# Patient Record
Sex: MALE | Race: WHITE | NOT HISPANIC OR LATINO | URBAN - METROPOLITAN AREA
[De-identification: names, ages, dates, MRNs, and addresses within clinical notes are randomized per-mention and may not be internally consistent; named-entity substitution may affect disease eponyms.]

---

## 2017-01-14 ENCOUNTER — EMERGENCY (EMERGENCY)
Facility: HOSPITAL | Age: 22
LOS: 0 days | Discharge: HOME | End: 2017-01-14
Admitting: EMERGENCY MEDICINE
Payer: COMMERCIAL
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
## 2017-01-14 DIAGNOSIS — S71.112A LACERATION WITHOUT FOREIGN BODY, LEFT THIGH, INITIAL ENCOUNTER: ICD-10-CM

## 2017-01-14 DIAGNOSIS — S81.819A LACERATION WITHOUT FOREIGN BODY, UNSPECIFIED LOWER LEG, INITIAL ENCOUNTER: ICD-10-CM
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
## 2017-01-14 PROCEDURE — 12001 RPR S/N/AX/GEN/TRNK 2.5CM/<: CPT

## 2017-01-14 PROCEDURE — 73562 X-RAY EXAM OF KNEE 3: CPT | Mod: 26,LT

## 2017-01-14 PROCEDURE — 99283 EMERGENCY DEPT VISIT LOW MDM: CPT | Mod: 25

## 2017-01-30 ENCOUNTER — EMERGENCY (EMERGENCY)
Facility: HOSPITAL | Age: 22
LOS: 0 days | Discharge: HOME | End: 2017-01-30
Attending: EMERGENCY MEDICINE | Admitting: EMERGENCY MEDICINE
Payer: COMMERCIAL

## 2017-01-30 VITALS
DIASTOLIC BLOOD PRESSURE: 80 MMHG | HEIGHT: 69 IN | HEART RATE: 103 BPM | RESPIRATION RATE: 18 BRPM | WEIGHT: 151.02 LBS | SYSTOLIC BLOOD PRESSURE: 154 MMHG | TEMPERATURE: 98 F
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
## 2017-01-30 DIAGNOSIS — S81.812A LACERATION WITHOUT FOREIGN BODY, LEFT LOWER LEG, INITIAL ENCOUNTER: ICD-10-CM
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
## 2017-01-30 PROCEDURE — 99282 EMERGENCY DEPT VISIT SF MDM: CPT

## 2017-01-30 NOTE — ED PROVIDER NOTE - CARE PLAN
Principal Discharge DX:	Suture check  Instructions for follow-up, activity and diet:	Keep wound dry and clean.  Return for any signs of infection as discussed.  Return for any new/worsening symptoms or concerns.

## 2017-01-30 NOTE — ED ADULT NURSE NOTE - CHPI ED SYMPTOMS NEG
no redness/no bleeding at site/no chills/no red streaks/no bleeding/no purulent drainage/no fever/no pain/no drainage

## 2017-01-30 NOTE — ED PROVIDER NOTE - PLAN OF CARE
Keep wound dry and clean.  Return for any signs of infection as discussed.  Return for any new/worsening symptoms or concerns.

## 2017-01-30 NOTE — ED PROVIDER NOTE - MEDICAL DECISION MAKING DETAILS
21M presents for wound check and suture removal s/p puncture wound from glass approx 10 days ago.  VSS, wound healed with no signs of infection or wound dehiscence.  Remove sutures, TBD.

## 2017-01-30 NOTE — ED PROVIDER NOTE - OBJECTIVE STATEMENT
21M healthy presents for suture removal.  Pt states he accidentally sat on glass in bed with wound to LLE s/p approximation with 2 suture approx 10 days ago.  Tetanus updated at that time.  Denies fever/chills, severe pain, erythema or purulent wound discharge.

## 2017-01-30 NOTE — ED PROVIDER NOTE - SKIN, MLM
Skin normal color for race, warm, dry,  approx 1.5cm wound to lateral LLE, healed with 2 nonabsorbable interrupted sutures with no wound dehiscence, no surround erythema or discharge.

## 2018-06-22 ENCOUNTER — EMERGENCY (EMERGENCY)
Facility: HOSPITAL | Age: 23
LOS: 0 days | Discharge: HOME | End: 2018-06-23
Attending: EMERGENCY MEDICINE | Admitting: STUDENT IN AN ORGANIZED HEALTH CARE EDUCATION/TRAINING PROGRAM
Payer: COMMERCIAL

## 2018-06-22 VITALS
HEART RATE: 99 BPM | WEIGHT: 160.06 LBS | HEIGHT: 70 IN | RESPIRATION RATE: 18 BRPM | TEMPERATURE: 98 F | OXYGEN SATURATION: 99 % | DIASTOLIC BLOOD PRESSURE: 84 MMHG | SYSTOLIC BLOOD PRESSURE: 108 MMHG

## 2018-06-22 LAB
ALBUMIN SERPL ELPH-MCNC: 3.4 G/DL — SIGNIFICANT CHANGE UP (ref 3.3–5)
ALP SERPL-CCNC: 76 U/L — SIGNIFICANT CHANGE UP (ref 40–120)
ALT FLD-CCNC: 24 U/L — SIGNIFICANT CHANGE UP (ref 12–78)
ANION GAP SERPL CALC-SCNC: 7 MMOL/L — SIGNIFICANT CHANGE UP (ref 5–17)
APAP SERPL-MCNC: <2 UG/ML — LOW (ref 10–30)
APPEARANCE UR: CLEAR — SIGNIFICANT CHANGE UP
AST SERPL-CCNC: 17 U/L — SIGNIFICANT CHANGE UP (ref 15–37)
BASOPHILS # BLD AUTO: 0.02 K/UL — SIGNIFICANT CHANGE UP (ref 0–0.2)
BASOPHILS NFR BLD AUTO: 0.3 % — SIGNIFICANT CHANGE UP (ref 0–2)
BILIRUB DIRECT SERPL-MCNC: 0.1 MG/DL — SIGNIFICANT CHANGE UP (ref 0–0.2)
BILIRUB INDIRECT FLD-MCNC: 0.2 MG/DL — SIGNIFICANT CHANGE UP (ref 0.2–1)
BILIRUB SERPL-MCNC: 0.3 MG/DL — SIGNIFICANT CHANGE UP (ref 0.2–1.2)
BILIRUB UR-MCNC: NEGATIVE — SIGNIFICANT CHANGE UP
BUN SERPL-MCNC: 15 MG/DL — SIGNIFICANT CHANGE UP (ref 7–23)
CALCIUM SERPL-MCNC: 8.2 MG/DL — LOW (ref 8.5–10.1)
CHLORIDE SERPL-SCNC: 110 MMOL/L — HIGH (ref 96–108)
CO2 SERPL-SCNC: 26 MMOL/L — SIGNIFICANT CHANGE UP (ref 22–31)
COLOR SPEC: YELLOW — SIGNIFICANT CHANGE UP
CREAT SERPL-MCNC: 0.69 MG/DL — SIGNIFICANT CHANGE UP (ref 0.5–1.3)
DIFF PNL FLD: NEGATIVE — SIGNIFICANT CHANGE UP
EOSINOPHIL # BLD AUTO: 0.2 K/UL — SIGNIFICANT CHANGE UP (ref 0–0.5)
EOSINOPHIL NFR BLD AUTO: 3.1 % — SIGNIFICANT CHANGE UP (ref 0–6)
ETHANOL SERPL-MCNC: <10 MG/DL — SIGNIFICANT CHANGE UP (ref 0–10)
GLUCOSE SERPL-MCNC: 106 MG/DL — HIGH (ref 70–99)
GLUCOSE UR QL: NEGATIVE MG/DL — SIGNIFICANT CHANGE UP
HCT VFR BLD CALC: 37.4 % — LOW (ref 39–50)
HGB BLD-MCNC: 12.6 G/DL — LOW (ref 13–17)
HIV 1 & 2 AB SERPL IA.RAPID: SIGNIFICANT CHANGE UP
IMM GRANULOCYTES NFR BLD AUTO: 0.3 % — SIGNIFICANT CHANGE UP (ref 0–1.5)
KETONES UR-MCNC: NEGATIVE — SIGNIFICANT CHANGE UP
LEUKOCYTE ESTERASE UR-ACNC: ABNORMAL
LYMPHOCYTES # BLD AUTO: 2.53 K/UL — SIGNIFICANT CHANGE UP (ref 1–3.3)
LYMPHOCYTES # BLD AUTO: 39.5 % — SIGNIFICANT CHANGE UP (ref 13–44)
MCHC RBC-ENTMCNC: 29 PG — SIGNIFICANT CHANGE UP (ref 27–34)
MCHC RBC-ENTMCNC: 33.7 GM/DL — SIGNIFICANT CHANGE UP (ref 32–36)
MCV RBC AUTO: 86.2 FL — SIGNIFICANT CHANGE UP (ref 80–100)
MONOCYTES # BLD AUTO: 0.55 K/UL — SIGNIFICANT CHANGE UP (ref 0–0.9)
MONOCYTES NFR BLD AUTO: 8.6 % — SIGNIFICANT CHANGE UP (ref 2–14)
NEUTROPHILS # BLD AUTO: 3.08 K/UL — SIGNIFICANT CHANGE UP (ref 1.8–7.4)
NEUTROPHILS NFR BLD AUTO: 48.2 % — SIGNIFICANT CHANGE UP (ref 43–77)
NITRITE UR-MCNC: NEGATIVE — SIGNIFICANT CHANGE UP
NRBC # BLD: 0 /100 WBCS — SIGNIFICANT CHANGE UP (ref 0–0)
PH UR: 6 — SIGNIFICANT CHANGE UP (ref 5–8)
PLATELET # BLD AUTO: 187 K/UL — SIGNIFICANT CHANGE UP (ref 150–400)
POTASSIUM SERPL-MCNC: 3.4 MMOL/L — LOW (ref 3.5–5.3)
POTASSIUM SERPL-SCNC: 3.4 MMOL/L — LOW (ref 3.5–5.3)
PROT SERPL-MCNC: 5.8 GM/DL — LOW (ref 6–8.3)
PROT UR-MCNC: NEGATIVE MG/DL — SIGNIFICANT CHANGE UP
RBC # BLD: 4.34 M/UL — SIGNIFICANT CHANGE UP (ref 4.2–5.8)
RBC # FLD: 12 % — SIGNIFICANT CHANGE UP (ref 10.3–14.5)
SALICYLATES SERPL-MCNC: <1.7 MG/DL — LOW (ref 2.8–20)
SODIUM SERPL-SCNC: 143 MMOL/L — SIGNIFICANT CHANGE UP (ref 135–145)
SP GR SPEC: 1.02 — SIGNIFICANT CHANGE UP (ref 1.01–1.02)
UROBILINOGEN FLD QL: NEGATIVE MG/DL — SIGNIFICANT CHANGE UP
WBC # BLD: 6.4 K/UL — SIGNIFICANT CHANGE UP (ref 3.8–10.5)
WBC # FLD AUTO: 6.4 K/UL — SIGNIFICANT CHANGE UP (ref 3.8–10.5)
WBC UR QL: SIGNIFICANT CHANGE UP

## 2018-06-22 PROCEDURE — 99285 EMERGENCY DEPT VISIT HI MDM: CPT

## 2018-06-22 PROCEDURE — 93010 ELECTROCARDIOGRAM REPORT: CPT

## 2018-06-22 NOTE — ED ADULT NURSE NOTE - OBJECTIVE STATEMENT
23 y/o male awake alert and oriented x4 BIBPD s/p pt's ex-boyfriend's mother called the police c/o pt making delusional comments. pt HIV (+). Pt request to be tested for HIV this visit. pt calm, cooperative in ED. Denies SI/HI.
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
## 2018-06-22 NOTE — ED PROVIDER NOTE - OBJECTIVE STATEMENT
23 y/o m presenting to the ED BIBPD due to delusions and agitation. PD 2* called by pt's step mother because pt was making delusional statements and being disruptive. Denies fever, chills. 21 y/o m with PMHx of borderline personality disorder, Crystal Meth abuse presenting to the ED BIBPD due to delusions and agitation. PD 2* called by pt's step mother because pt was making delusional statements and being disruptive. Denies fever, chills. Pt requesting HIV testing in ED. 21 y/o m with PMHx of borderline personality disorder, Crystal Meth abuse presenting to the ED BIBPD due to delusions and agitation. PD 2* called by pt's step mother because pt was making delusional statements and being disruptive. Denies fever, chills. Pt requesting HIV testing in ED. Denies SI, HI. 23 y/o m with PMHx of borderline personality disorder, Crystal Meth abuse presenting to the ED BIBPD due to delusions and agitation. PD 2* called by pt's step mother because pt was making delusional statements and being disruptive. Pt denies any Acute c/o. Requesting HIV testing in ED. Denies SI, HI. Pt does not want to answer questions at this time, hx obtained from EMS.

## 2018-06-23 VITALS
TEMPERATURE: 98 F | HEART RATE: 58 BPM | OXYGEN SATURATION: 100 % | SYSTOLIC BLOOD PRESSURE: 108 MMHG | RESPIRATION RATE: 18 BRPM | DIASTOLIC BLOOD PRESSURE: 50 MMHG

## 2018-06-23 DIAGNOSIS — F15.929 OTHER STIMULANT USE, UNSPECIFIED WITH INTOXICATION, UNSPECIFIED: ICD-10-CM

## 2018-06-23 LAB
AMPHET UR-MCNC: POSITIVE — SIGNIFICANT CHANGE UP
BARBITURATES UR SCN-MCNC: NEGATIVE — SIGNIFICANT CHANGE UP
BENZODIAZ UR-MCNC: NEGATIVE — SIGNIFICANT CHANGE UP
COCAINE METAB.OTHER UR-MCNC: NEGATIVE — SIGNIFICANT CHANGE UP
METHADONE UR-MCNC: NEGATIVE — SIGNIFICANT CHANGE UP
OPIATES UR-MCNC: NEGATIVE — SIGNIFICANT CHANGE UP
PCP SPEC-MCNC: SIGNIFICANT CHANGE UP
PCP UR-MCNC: NEGATIVE — SIGNIFICANT CHANGE UP
THC UR QL: NEGATIVE — SIGNIFICANT CHANGE UP

## 2018-06-23 PROCEDURE — 90792 PSYCH DIAG EVAL W/MED SRVCS: CPT | Mod: GT

## 2018-06-23 RX ORDER — HALOPERIDOL DECANOATE 100 MG/ML
5 INJECTION INTRAMUSCULAR ONCE
Qty: 0 | Refills: 0 | Status: COMPLETED | OUTPATIENT
Start: 2018-06-23 | End: 2018-06-23

## 2018-06-23 RX ADMIN — Medication 4 MILLIGRAM(S): at 00:45

## 2018-06-23 RX ADMIN — HALOPERIDOL DECANOATE 5 MILLIGRAM(S): 100 INJECTION INTRAMUSCULAR at 00:46

## 2018-06-23 NOTE — ED BEHAVIORAL HEALTH ASSESSMENT NOTE - PSYCHIATRIC ISSUES AND PLAN (INCLUDE STANDING AND PRN MEDICATION)
Hold in ED for safety and further observation, to reassess upon possible sobriety in the AM with a live assessment
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
## 2018-06-23 NOTE — ED BEHAVIORAL HEALTH ASSESSMENT NOTE - DESCRIPTION
paranoia and refusal to cooperate in the ED unable to assess was living with his ex-boyfriend and ex-boyfriends mother

## 2018-06-23 NOTE — ED BEHAVIORAL HEALTH ASSESSMENT NOTE - SUMMARY
23 yo M with a reported history of borderline personality disorder and crystal methamphetamine use disorder presents to the ED with complaints of paranoia; in the context of likely crystal meth intoxication. Currently, he is uncooperative and turned off the telepsychiatry monitor after he came on screen. Per collateral and prior medical records, his mental status exam is significant for paranoid delusions about his ex-boyfriend working for the [employer], phone lines being tapped and homicidal threats toward them. Impressions include acute psychosis secondary to crystal methamphetamine intoxication. It's possible that this may clear with additional time as he metabolizes the illicit substance, or he may remain psychotic even with additional time. Since he will not cooperate with a remote assessment, he will need to be assessed in person to determine his final disposition. Will hold in the ED for safety and further observation for now.

## 2018-06-24 ENCOUNTER — EMERGENCY (EMERGENCY)
Facility: HOSPITAL | Age: 23
LOS: 1 days | Discharge: OTHER INSTITUTION | End: 2018-06-24
Attending: EMERGENCY MEDICINE
Payer: COMMERCIAL

## 2018-06-24 VITALS
DIASTOLIC BLOOD PRESSURE: 75 MMHG | HEIGHT: 66 IN | OXYGEN SATURATION: 100 % | HEART RATE: 86 BPM | RESPIRATION RATE: 18 BRPM | WEIGHT: 149.91 LBS | TEMPERATURE: 98 F | SYSTOLIC BLOOD PRESSURE: 122 MMHG

## 2018-06-24 DIAGNOSIS — F16.10 HALLUCINOGEN ABUSE, UNCOMPLICATED: ICD-10-CM

## 2018-06-24 DIAGNOSIS — F12.10 CANNABIS ABUSE, UNCOMPLICATED: ICD-10-CM

## 2018-06-24 DIAGNOSIS — F60.3 BORDERLINE PERSONALITY DISORDER: ICD-10-CM

## 2018-06-24 DIAGNOSIS — F19.94 OTHER PSYCHOACTIVE SUBSTANCE USE, UNSPECIFIED WITH PSYCHOACTIVE SUBSTANCE-INDUCED MOOD DISORDER: ICD-10-CM

## 2018-06-24 LAB
ALBUMIN SERPL ELPH-MCNC: 4.2 G/DL — SIGNIFICANT CHANGE UP (ref 3.3–5.2)
ALP SERPL-CCNC: 69 U/L — SIGNIFICANT CHANGE UP (ref 40–120)
ALT FLD-CCNC: 19 U/L — SIGNIFICANT CHANGE UP
AMPHET UR-MCNC: NEGATIVE — SIGNIFICANT CHANGE UP
ANION GAP SERPL CALC-SCNC: 12 MMOL/L — SIGNIFICANT CHANGE UP (ref 5–17)
APAP SERPL-MCNC: <7.5 UG/ML — LOW (ref 10–26)
APPEARANCE UR: CLEAR — SIGNIFICANT CHANGE UP
AST SERPL-CCNC: 21 U/L — SIGNIFICANT CHANGE UP
BARBITURATES UR SCN-MCNC: NEGATIVE — SIGNIFICANT CHANGE UP
BASOPHILS # BLD AUTO: 0 K/UL — SIGNIFICANT CHANGE UP (ref 0–0.2)
BASOPHILS NFR BLD AUTO: 0.1 % — SIGNIFICANT CHANGE UP (ref 0–2)
BENZODIAZ UR-MCNC: NEGATIVE — SIGNIFICANT CHANGE UP
BILIRUB SERPL-MCNC: 0.2 MG/DL — LOW (ref 0.4–2)
BILIRUB UR-MCNC: NEGATIVE — SIGNIFICANT CHANGE UP
BUN SERPL-MCNC: 18 MG/DL — SIGNIFICANT CHANGE UP (ref 8–20)
CALCIUM SERPL-MCNC: 9.4 MG/DL — SIGNIFICANT CHANGE UP (ref 8.6–10.2)
CHLORIDE SERPL-SCNC: 103 MMOL/L — SIGNIFICANT CHANGE UP (ref 98–107)
CO2 SERPL-SCNC: 26 MMOL/L — SIGNIFICANT CHANGE UP (ref 22–29)
COCAINE METAB.OTHER UR-MCNC: NEGATIVE — SIGNIFICANT CHANGE UP
COLOR SPEC: YELLOW — SIGNIFICANT CHANGE UP
CREAT SERPL-MCNC: 0.69 MG/DL — SIGNIFICANT CHANGE UP (ref 0.5–1.3)
DIFF PNL FLD: NEGATIVE — SIGNIFICANT CHANGE UP
EOSINOPHIL # BLD AUTO: 0.2 K/UL — SIGNIFICANT CHANGE UP (ref 0–0.5)
EOSINOPHIL NFR BLD AUTO: 2 % — SIGNIFICANT CHANGE UP (ref 0–5)
ETHANOL SERPL-MCNC: <10 MG/DL — SIGNIFICANT CHANGE UP
GLUCOSE SERPL-MCNC: 97 MG/DL — SIGNIFICANT CHANGE UP (ref 70–115)
GLUCOSE UR QL: NEGATIVE MG/DL — SIGNIFICANT CHANGE UP
HCT VFR BLD CALC: 41.9 % — LOW (ref 42–52)
HGB BLD-MCNC: 14.1 G/DL — SIGNIFICANT CHANGE UP (ref 14–18)
KETONES UR-MCNC: NEGATIVE — SIGNIFICANT CHANGE UP
LEUKOCYTE ESTERASE UR-ACNC: ABNORMAL
LYMPHOCYTES # BLD AUTO: 1.9 K/UL — SIGNIFICANT CHANGE UP (ref 1–4.8)
LYMPHOCYTES # BLD AUTO: 25.2 % — SIGNIFICANT CHANGE UP (ref 20–55)
MCHC RBC-ENTMCNC: 29.2 PG — SIGNIFICANT CHANGE UP (ref 27–31)
MCHC RBC-ENTMCNC: 33.7 G/DL — SIGNIFICANT CHANGE UP (ref 32–36)
MCV RBC AUTO: 86.7 FL — SIGNIFICANT CHANGE UP (ref 80–94)
METHADONE UR-MCNC: NEGATIVE — SIGNIFICANT CHANGE UP
MONOCYTES # BLD AUTO: 0.7 K/UL — SIGNIFICANT CHANGE UP (ref 0–0.8)
MONOCYTES NFR BLD AUTO: 9.3 % — SIGNIFICANT CHANGE UP (ref 3–10)
NEUTROPHILS # BLD AUTO: 4.7 K/UL — SIGNIFICANT CHANGE UP (ref 1.8–8)
NEUTROPHILS NFR BLD AUTO: 63.1 % — SIGNIFICANT CHANGE UP (ref 37–73)
NITRITE UR-MCNC: NEGATIVE — SIGNIFICANT CHANGE UP
OPIATES UR-MCNC: NEGATIVE — SIGNIFICANT CHANGE UP
PCP SPEC-MCNC: SIGNIFICANT CHANGE UP
PCP UR-MCNC: NEGATIVE — SIGNIFICANT CHANGE UP
PH UR: 8 — SIGNIFICANT CHANGE UP (ref 5–8)
PLATELET # BLD AUTO: 195 K/UL — SIGNIFICANT CHANGE UP (ref 150–400)
POTASSIUM SERPL-MCNC: 4.2 MMOL/L — SIGNIFICANT CHANGE UP (ref 3.5–5.3)
POTASSIUM SERPL-SCNC: 4.2 MMOL/L — SIGNIFICANT CHANGE UP (ref 3.5–5.3)
PROT SERPL-MCNC: 6.4 G/DL — LOW (ref 6.6–8.7)
PROT UR-MCNC: NEGATIVE MG/DL — SIGNIFICANT CHANGE UP
RBC # BLD: 4.83 M/UL — SIGNIFICANT CHANGE UP (ref 4.6–6.2)
RBC # FLD: 12.2 % — SIGNIFICANT CHANGE UP (ref 11–15.6)
SALICYLATES SERPL-MCNC: <0.6 MG/DL — LOW (ref 10–20)
SODIUM SERPL-SCNC: 141 MMOL/L — SIGNIFICANT CHANGE UP (ref 135–145)
SP GR SPEC: 1.01 — SIGNIFICANT CHANGE UP (ref 1.01–1.02)
THC UR QL: NEGATIVE — SIGNIFICANT CHANGE UP
TSH SERPL-MCNC: 0.73 UIU/ML — SIGNIFICANT CHANGE UP (ref 0.27–4.2)
UROBILINOGEN FLD QL: NEGATIVE MG/DL — SIGNIFICANT CHANGE UP
WBC # BLD: 7.5 K/UL — SIGNIFICANT CHANGE UP (ref 4.8–10.8)
WBC # FLD AUTO: 7.5 K/UL — SIGNIFICANT CHANGE UP (ref 4.8–10.8)
WBC UR QL: SIGNIFICANT CHANGE UP

## 2018-06-24 PROCEDURE — 81001 URINALYSIS AUTO W/SCOPE: CPT

## 2018-06-24 PROCEDURE — 93005 ELECTROCARDIOGRAM TRACING: CPT

## 2018-06-24 PROCEDURE — 99285 EMERGENCY DEPT VISIT HI MDM: CPT | Mod: 25

## 2018-06-24 PROCEDURE — 80053 COMPREHEN METABOLIC PANEL: CPT

## 2018-06-24 PROCEDURE — 90792 PSYCH DIAG EVAL W/MED SRVCS: CPT

## 2018-06-24 PROCEDURE — 36415 COLL VENOUS BLD VENIPUNCTURE: CPT

## 2018-06-24 PROCEDURE — 84443 ASSAY THYROID STIM HORMONE: CPT

## 2018-06-24 PROCEDURE — 85027 COMPLETE CBC AUTOMATED: CPT

## 2018-06-24 PROCEDURE — 93010 ELECTROCARDIOGRAM REPORT: CPT

## 2018-06-24 PROCEDURE — 80307 DRUG TEST PRSMV CHEM ANLYZR: CPT

## 2018-06-24 NOTE — ED ADULT NURSE NOTE - CHPI ED SYMPTOMS NEG
no homicidal/no agitation/no confusion/no suicidal/no weakness/no change in level of consciousness/no hallucinations/no disorientation/no paranoia

## 2018-06-24 NOTE — ED ADULT TRIAGE NOTE - CHIEF COMPLAINT QUOTE
pt sent from [facility] for suicidal ideations, EMS reports they "have no beds." pt with no medical complaints, hx use of crystal meth and suicidal attempt about 1 week ago. pt is awake, alert, oriented, calm and cooperative on arrival, reports he went to [facility] voluntarily. pt seen by ER MD immediately on arrival, pt sent to ED psych.

## 2018-06-24 NOTE — ED ADULT NURSE NOTE - CHIEF COMPLAINT QUOTE
pt sent from [facility] for suicidal ideations, EMS reports they "have no beds." pt with no medical complaints, hx use of crystal meth and suicidal attempt about 1 week ago. pt is awake, alert, oriented, calm and cooperative on arrival, reports he went to [facility] voluntarily. pt seen by ER MD immediately on arrival, pt sent to ED psych.

## 2018-06-24 NOTE — ED ADULT NURSE NOTE - OBJECTIVE STATEMENT
Patient presented in  area dressed in sweat shirt and swim trunks.  Patient awake and alert.  Speech is clear.  Patient reports he went to [facility] today seeking inpatient hospitalization for depression and abusing crystal meth by injection.  Last use of crystal meth was a week ago and he has been abusing it for 3 years.  Patient reports he has been fighting with his partner about getting help.  Patient has been treated psychiatrically in the past with medication but has not been on any in over 3 years and is not in any current treatment.  Feels hopeless but denies suicidal ideation or intent to harm himself.  Denies any homicidal ideations.  Denies auditory or visual hallucinations and no overt delusions expressed.

## 2018-06-24 NOTE — ED PROVIDER NOTE - OBJECTIVE STATEMENT
23 y/o M pt BIBA with hx of borderline personality disorder and substance abuse presents to ED from [facility] for SI. Pt went to [facility] for treatment but they did not have any beds available so pt was sent to [facility] ED. Pt states he has been depressed for some time but has no thoughts of harming himself or others. He last used Crystal Meth 4-5 days ago. Denies hx of HTN or DM, HI, hallucinations, abdominal pain, cough, blurry vision, diplopia, HA, nausea, vomiting, CP and SOB. No further complaints at this time.

## 2018-06-24 NOTE — ED BEHAVIORAL HEALTH ASSESSMENT NOTE - HPI (INCLUDE ILLNESS QUALITY, SEVERITY, DURATION, TIMING, CONTEXT, MODIFYING FACTORS, ASSOCIATED SIGNS AND SYMPTOMS)
Patient a 23 y/o single male, unemployed, domiciled with partner, no prior SI/SA, hx of Cannabis/Ecstasy abuse, no prior Psychiatric hospitalization, no medical issues was BIB/EMS due to above reasons.    Patient earlier went to [facility] and were referred to come her for help. Patient endorsed that he has been using Ecstasy for more than a year, and was also using cannabis 2-3 times a week since age 15 and for a year has been using almost daily. Last time he used was past Wednesday. He feels depressed, depressed as he unemployed for 2-3 years, and has no motivation to do anything now. He denied being depressed, but at the same time he added that he is OK, feels anxious at times, denied A/H or paranoid beliefs, but was acting bizarre when asked " Whether FBI/JOHN are watching you, he answered why are you asking that questions ". He later added that hears voices with V/H when he is high on Ecstasy and also sees shadows, with good sleep/appetite. He added that he wants to get better, good with partner and have education in Criminal Justice. He later added that he was diagnosed with ADHD, was prescribed Straterra/Trileptal/Lithium which was given by a PCP and later stopped taking them for reasons unknown. He added that at times he gets energetic, and starts cleaning the house, be more pro-active  with multi-task involvement and does not feel tired or fatigued. He was guarded during interview, was answering questions slowly and taking time to think.    Writer called his partner @ [phone] who informed that he is acting bizarre, paranoid, delusional, he is violent, at times he is also physical with him, verbally cursing him, and also very paranoid that he feels that is partner is working with police and JOHN for reasons unknown. Patient a 23 y/o single male, unemployed, domiciled with partner, no prior SI/SA, hx of Cannabis/Ecstasy abuse, no prior Psychiatric hospitalization, no medical issues was BIB/EMS due to above reasons.    Patient earlier went to [facility] and were referred to come her for help. Patient endorsed that he has been using Ecstasy for more than a year, and was also using cannabis 2-3 times a week since age 15 and for a year has been using almost daily. Last time he used was past Wednesday. He feels depressed, depressed as he unemployed for 2-3 years, and has no motivation to do anything now. He denied being depressed, but at the same time he added that he is OK, feels anxious at times, denied A/H or paranoid beliefs, but was acting bizarre when asked " Whether FBI/JOHN are watching you, he answered why are you asking that questions ". He later added that hears voices with V/H when he is high on Ecstasy and also sees shadows, with good sleep/appetite. He added that he wants to get better, good with partner and have education in Criminal Justice. He later added that he was diagnosed with ADHD, was prescribed Straterra/Trileptal/Lithium which was given by a PCP and later stopped taking them for reasons unknown. He added that at times he gets energetic, and starts cleaning the house, be more pro-active  with multi-task involvement and does not feel tired or fatigued. He was guarded during interview, was answering questions slowly and taking time to think.    Writer called his partner @ [phone] who informed that he is acting bizarre, paranoid, delusional, he is violent, at times he is also physical with him, verbally cursing him, and also very paranoid that he feels that his partner is working with police and JOHN for reasons unknown.

## 2018-06-24 NOTE — ED BEHAVIORAL HEALTH ASSESSMENT NOTE - SUMMARY
Patient a 21 y/o single male, unemployed, domiciled with partner, no prior SI/SA, hx of Cannabis/Ecstasy abuse, no prior Psychiatric hospitalization, no medical issues was BIB/EMS due to above reasons.    Patient earlier went to [facility] and were referred to come her for help. Patient endorsed that he has been using Ecstasy for more than a year, and was also using cannabis 2-3 times a week since age 15 and for a year has been using almost daily. Last time he used was past Wednesday. He feels depressed, depressed as he unemployed for 2-3 years, and has no motivation to do anything now. He denied being depressed, but at the same time he added that he is OK, feels anxious at times, denied A/H or paranoid beliefs, but was acting bizarre when asked " Whether FBI/JOHN are watching you, he answered why are you asking that questions ". He later added that hears voices with V/H when he is high on Ecstasy and also sees shadows, with good sleep/appetite. He added that he wants to get better, good with partner and have education in Criminal Justice. He later added that he was diagnosed with ADHD, was prescribed Straterra/Trileptal/Lithium which was given by a PCP and later stopped taking them for reasons unknown. He added that at times he gets energetic, and starts cleaning the house, be more pro-active  with multi-task involvement and does not feel tired or fatigued. He was guarded during interview, was answering questions slowly and taking time to think.    Writer called his partner @ [phone] who informed that he is acting bizarre, paranoid, delusional, he is violent, at times he is also physical with him, verbally cursing him, and also very paranoid that he feels that is partner is working with police and JOHN for reasons unknown.    Patient has been using Ecstasy for years, depressed with passive SI with no plans, guarded, delusional and non-compliant with meds  would benefit from In-patient hospitalization for stability/safety a ndneds adjustment.

## 2018-06-24 NOTE — ED PROVIDER NOTE - CHPI ED SYMPTOMS NEG
no hallucinations/abdominal pain, cough, visual changes, HA, nausea, vomiting, CP and SOB/no homicidal

## 2018-06-24 NOTE — ED BEHAVIORAL HEALTH ASSESSMENT NOTE - NS ED BHA MED ROS HEMATOLOGIC LYMPHATIC
Call patient.  Encourage him to keep well hydrated and to call me if he has further episodes of nearly passing out.  If he doesn't have further prolonged episodes, then the 12/6/17 appt is ok.   No complaints

## 2018-06-24 NOTE — ED BEHAVIORAL HEALTH NOTE - BEHAVIORAL HEALTH NOTE
SWNote: pt seen by psych MD (Dr Ferreira) found to benefit from inpatient hospitalization . Worker called [facility] ([facility]) no beds available  and [facility] ([facility]) . [facility] requested by pt's mother given that pt attended program and psych services at that facility, as per Nigel no beds available tonight . Worker will fax needed paperwork  to [facility] for possible bed in the am. SW to follow. SWNote: pt seen by psych MD (Dr Ferreira) found to benefit from inpatient hospitalization on a 9.13 status . Worker called [facility] ([facility]) no beds available other hospitals called, no beds available tonight. Pt will stay overnight, worker faxed all paperwork to [facility] for review in case  bed available in the am. SW to follow.

## 2018-06-25 VITALS
HEART RATE: 99 BPM | TEMPERATURE: 99 F | RESPIRATION RATE: 20 BRPM | DIASTOLIC BLOOD PRESSURE: 76 MMHG | SYSTOLIC BLOOD PRESSURE: 121 MMHG | OXYGEN SATURATION: 99 %

## 2018-06-25 DIAGNOSIS — F15.10 OTHER STIMULANT ABUSE, UNCOMPLICATED: ICD-10-CM

## 2018-06-25 DIAGNOSIS — Z11.4 ENCOUNTER FOR SCREENING FOR HUMAN IMMUNODEFICIENCY VIRUS [HIV]: ICD-10-CM

## 2018-06-25 PROCEDURE — 99211 OFF/OP EST MAY X REQ PHY/QHP: CPT

## 2018-06-25 PROCEDURE — 99213 OFFICE O/P EST LOW 20 MIN: CPT

## 2018-06-25 NOTE — ED ADULT NURSE REASSESSMENT NOTE - NS ED NURSE REASSESS COMMENT FT1
pt cleared by ER MD Louis for .  contacted and accepting, pt taken over on EMS stretcher with ED staff to psych area. EKG completed.
Pt continues to rest in no acute distress at this present time.  Will continue to monitor and maintain safety.
Pt currently resting in no acute distress at this time, pt states "I'm Okay", asked for water, provided to pt.  Pt appears calm at this time, denies any pain or discomfort at present.  Will continue to monitor and maintain safety.
Pt in no acute distress at this present time, pt is resting at this time.  Will continue to monitor and maintain safety.
Pt in no acute distress at this present time.  Resting, arousable.  Will continue to monitor and maintain safety.
Patient has been up and had breakfast this morning. Mood and affect depressed. Endorses need for inpatient hospitalization. No current suicidal thoughts, denied thoughts to harm self/others. Seeking facility for inpatient transfer. Resting in bed at present. Continuing to monitor and reassess.

## 2018-06-25 NOTE — ED BEHAVIORAL HEALTH NOTE - BEHAVIORAL HEALTH NOTE
SW Note: pt has been accepted to [facility] for inpt psychiatric tx. Met with pt, 9.13 legals completed. Accepting MD, Dr. Pena. Ambulance arranged with [facility]. Pt is uninsured, no auth needed. Pt used unit phone and called his mother.

## 2018-06-25 NOTE — PROGRESS NOTE ADULT - SUBJECTIVE AND OBJECTIVE BOX
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  No physical issues ROS: negative  Vital Signs Last 24 Hrs  T(C): 37.1 (2018 07:22), Max: 37.1 (2018 19:58)  T(F): 98.8 (2018 07:22), Max: 98.8 (2018 19:58)  HR: 86 (2018 07:22) (69 - 87)  BP: 127/75 (2018 07:22) (107/52 - 128/65)  RR: 20 (2018 07:22) (18 - 20)  SpO2: 98% (2018 07:22) (98% - 100%)                        14.1   7.5   )-----------( 195      ( 2018 17:21 )             41.9     06-24    141  |  103  |  18.0  ----------------------------<  97  4.2   |  26.0  |  0.69    Ca    9.4      2018 17:21    TPro  6.4<L>  /  Alb  4.2  /  TBili  0.2<L>  /  DBili  x   /  AST  21  /  ALT  19  /  AlkPhos  69  06-24    LIVER FUNCTIONS - ( 2018 17:21 )  Alb: 4.2 g/dL / Pro: 6.4 g/dL / ALK PHOS: 69 U/L / ALT: 19 U/L / AST: 21 U/L / GGT: x             Urinalysis Basic - ( 2018 17:08 )    Color: Yellow / Appearance: Clear / S.010 / pH: x  Gluc: x / Ketone: Negative  / Bili: Negative / Urobili: Negative mg/dL   Blood: x / Protein: Negative mg/dL / Nitrite: Negative   Leuk Esterase: Trace / RBC: x / WBC 0-2   Sq Epi: x / Non Sq Epi: x / Bacteria: x  Amphetamine, Urine: Positive (18 @ 21:06)    MEDICATIONS  (STANDING):  none

## 2018-06-26 NOTE — ED BEHAVIORAL HEALTH NOTE - BEHAVIORAL HEALTH NOTE
MOISE Note: [facility] informed  that pt has an active [employer] policy ID: [id number]. Called [phone] and completed precert. Spoke with Radha and auth was approved for 1 day 6/25/18, auth# [id number]. Review due with Radha , [phone], on 6/26/18. Info forwarded to [facility] dept.

## 2018-09-06 ENCOUNTER — EMERGENCY (EMERGENCY)
Facility: HOSPITAL | Age: 23
LOS: 0 days | Discharge: HOME | End: 2018-09-06
Attending: EMERGENCY MEDICINE | Admitting: EMERGENCY MEDICINE
Payer: COMMERCIAL

## 2018-09-06 VITALS
HEART RATE: 70 BPM | SYSTOLIC BLOOD PRESSURE: 114 MMHG | TEMPERATURE: 98 F | DIASTOLIC BLOOD PRESSURE: 62 MMHG | OXYGEN SATURATION: 98 % | RESPIRATION RATE: 16 BRPM

## 2018-09-06 VITALS — WEIGHT: 145.06 LBS | HEIGHT: 69 IN

## 2018-09-06 DIAGNOSIS — F60.3 BORDERLINE PERSONALITY DISORDER: ICD-10-CM

## 2018-09-06 DIAGNOSIS — F14.10 COCAINE ABUSE, UNCOMPLICATED: ICD-10-CM

## 2018-09-06 DIAGNOSIS — F15.10 OTHER STIMULANT ABUSE, UNCOMPLICATED: ICD-10-CM

## 2018-09-06 DIAGNOSIS — F31.70 BIPOLAR DISORDER, CURRENTLY IN REMISSION, MOST RECENT EPISODE UNSPECIFIED: ICD-10-CM

## 2018-09-06 DIAGNOSIS — Z91.19 PATIENT'S NONCOMPLIANCE WITH OTHER MEDICAL TREATMENT AND REGIMEN: ICD-10-CM

## 2018-09-06 DIAGNOSIS — F11.10 OPIOID ABUSE, UNCOMPLICATED: ICD-10-CM

## 2018-09-06 LAB
AMPHET UR-MCNC: POSITIVE — SIGNIFICANT CHANGE UP
APAP SERPL-MCNC: < 2 UG/ML (ref 10–30)
APPEARANCE UR: CLEAR — SIGNIFICANT CHANGE UP
BACTERIA # UR AUTO: ABNORMAL
BARBITURATES UR SCN-MCNC: NEGATIVE — SIGNIFICANT CHANGE UP
BASOPHILS # BLD AUTO: 0.04 K/UL — SIGNIFICANT CHANGE UP (ref 0–0.2)
BASOPHILS NFR BLD AUTO: 0.4 % — SIGNIFICANT CHANGE UP (ref 0–2)
BENZODIAZ UR-MCNC: NEGATIVE — SIGNIFICANT CHANGE UP
BILIRUB UR-MCNC: NEGATIVE — SIGNIFICANT CHANGE UP
COCAINE METAB.OTHER UR-MCNC: NEGATIVE — SIGNIFICANT CHANGE UP
COLOR SPEC: YELLOW — SIGNIFICANT CHANGE UP
COMMENT - URINE: SIGNIFICANT CHANGE UP
COMMENT - URINE: SIGNIFICANT CHANGE UP
DIFF PNL FLD: NEGATIVE — SIGNIFICANT CHANGE UP
EOSINOPHIL # BLD AUTO: 0.05 K/UL — SIGNIFICANT CHANGE UP (ref 0–0.5)
EOSINOPHIL NFR BLD AUTO: 0.6 % — SIGNIFICANT CHANGE UP (ref 0–6)
EPI CELLS # UR: SIGNIFICANT CHANGE UP
ETHANOL SERPL-MCNC: <10 MG/DL — SIGNIFICANT CHANGE UP (ref 0–10)
GLUCOSE UR QL: NEGATIVE MG/DL — SIGNIFICANT CHANGE UP
HCT VFR BLD CALC: 42.9 % — SIGNIFICANT CHANGE UP (ref 39–50)
HGB BLD-MCNC: 14.9 G/DL — SIGNIFICANT CHANGE UP (ref 13–17)
IMM GRANULOCYTES NFR BLD AUTO: 0.6 % — SIGNIFICANT CHANGE UP (ref 0–1.5)
KETONES UR-MCNC: ABNORMAL
LEUKOCYTE ESTERASE UR-ACNC: ABNORMAL
LYMPHOCYTES # BLD AUTO: 2.32 K/UL — SIGNIFICANT CHANGE UP (ref 1–3.3)
LYMPHOCYTES # BLD AUTO: 26.1 % — SIGNIFICANT CHANGE UP (ref 13–44)
MCHC RBC-ENTMCNC: 29.7 PG — SIGNIFICANT CHANGE UP (ref 27–34)
MCHC RBC-ENTMCNC: 34.7 GM/DL — SIGNIFICANT CHANGE UP (ref 32–36)
MCV RBC AUTO: 85.5 FL — SIGNIFICANT CHANGE UP (ref 80–100)
METHADONE UR-MCNC: NEGATIVE — SIGNIFICANT CHANGE UP
MONOCYTES # BLD AUTO: 0.78 K/UL — SIGNIFICANT CHANGE UP (ref 0–0.9)
MONOCYTES NFR BLD AUTO: 8.8 % — SIGNIFICANT CHANGE UP (ref 2–14)
NEUTROPHILS # BLD AUTO: 5.66 K/UL — SIGNIFICANT CHANGE UP (ref 1.8–7.4)
NEUTROPHILS NFR BLD AUTO: 63.5 % — SIGNIFICANT CHANGE UP (ref 43–77)
NITRITE UR-MCNC: NEGATIVE — SIGNIFICANT CHANGE UP
NRBC # BLD: 0 /100 WBCS — SIGNIFICANT CHANGE UP (ref 0–0)
OPIATES UR-MCNC: NEGATIVE — SIGNIFICANT CHANGE UP
PCP SPEC-MCNC: SIGNIFICANT CHANGE UP
PCP UR-MCNC: NEGATIVE — SIGNIFICANT CHANGE UP
PH UR: 5 — SIGNIFICANT CHANGE UP (ref 5–8)
PLATELET # BLD AUTO: 240 K/UL — SIGNIFICANT CHANGE UP (ref 150–400)
PROT UR-MCNC: 30 MG/DL
RBC # BLD: 5.02 M/UL — SIGNIFICANT CHANGE UP (ref 4.2–5.8)
RBC # FLD: 11.1 % — SIGNIFICANT CHANGE UP (ref 10.3–14.5)
RBC CASTS # UR COMP ASSIST: SIGNIFICANT CHANGE UP /HPF (ref 0–4)
SALICYLATES SERPL-MCNC: <1.7 MG/DL — LOW (ref 2.8–20)
SP GR SPEC: 1.02 — SIGNIFICANT CHANGE UP (ref 1.01–1.02)
THC UR QL: NEGATIVE — SIGNIFICANT CHANGE UP
TROPONIN I SERPL-MCNC: <0.015 NG/ML — SIGNIFICANT CHANGE UP (ref 0.01–0.04)
TSH SERPL-MCNC: 0.69 UU/ML — SIGNIFICANT CHANGE UP (ref 0.34–4.82)
UROBILINOGEN FLD QL: 1 MG/DL
WBC # BLD: 8.9 K/UL — SIGNIFICANT CHANGE UP (ref 3.8–10.5)
WBC # FLD AUTO: 8.9 K/UL — SIGNIFICANT CHANGE UP (ref 3.8–10.5)
WBC UR QL: SIGNIFICANT CHANGE UP

## 2018-09-06 PROCEDURE — 90792 PSYCH DIAG EVAL W/MED SRVCS: CPT

## 2018-09-06 PROCEDURE — 93010 ELECTROCARDIOGRAM REPORT: CPT

## 2018-09-06 PROCEDURE — 71045 X-RAY EXAM CHEST 1 VIEW: CPT | Mod: 26

## 2018-09-06 PROCEDURE — 99285 EMERGENCY DEPT VISIT HI MDM: CPT

## 2018-09-06 RX ORDER — ARIPIPRAZOLE 15 MG/1
30 TABLET ORAL DAILY
Qty: 0 | Refills: 0 | Status: DISCONTINUED | OUTPATIENT
Start: 2018-09-06 | End: 2018-09-06

## 2018-09-06 NOTE — ED BEHAVIORAL HEALTH ASSESSMENT NOTE - RISK ASSESSMENT
Pt is min  risk for SA however engages in high risk behaviors, drug use and is noncompliant with tx recommendations.  No h/o SA but h/o cutting.  Pt in partial day program and states he is willing to continue with treatment tomorrow.

## 2018-09-06 NOTE — ED BEHAVIORAL HEALTH ASSESSMENT NOTE - DESCRIPTION
Calm and cooperative hyper vigilent, denies depression, SI/HI/AH.  Admits to using Methamphetamine and Heroin (small amt) today with a little cocaine. 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  Pt never knew his faTHER.

## 2018-09-06 NOTE — ED ADULT NURSE NOTE - NSIMPLEMENTINTERV_GEN_ALL_ED
Implemented All Universal Safety Interventions:  [place] to call system. Call bell, personal items and telephone within reach. Instruct patient to call for assistance. Room bathroom lighting operational. Non-slip footwear when patient is off stretcher. Physically safe environment: no spills, clutter or unnecessary equipment. Stretcher in lowest position, wheels locked, appropriate side rails in place.

## 2018-09-06 NOTE — ED BEHAVIORAL HEALTH ASSESSMENT NOTE - SUMMARY
Patient a 21 y/o cheung single male, unemployed, living with mother in [place] after leaving prior residence when domiciled with partner, no prior SI/SA, hx of Cannabis/Ecstasy abuse, 3 prior psych admission 2 as child for cutting and behavior and one in June of this year, when he was diagnosed with ADHD, ADD, Bipolar disorder and Personality Disorder, no h/o SA but h/o cutting,  no medical issues was BIB/EMS for unclear reasons.  Police reports Pt needs "psychological eval" for missed meds.  Pt reports he called police to see if he has a warrant out fo his arrest.  He also says he was visiting bf today and his mother called police because she does not him at the house anymore.  Pt refused to give details.  Pt came to ED for Abilify 30 mg, and denies acute psychiatric symptoms, including paranoia, depression, SI/HI or AH.  Pt admits to using Methamphetamine IV, Heroin IV and cocaine today.  Pt offered referral for substance abuse treatment and declined saying he want to do on out Pt.  Pt plans to resume with IOP day program at [facility] tomorrow and states he will disclose his drug use and ED visit. Pt is not an imminent danger to self or others and is cleared psychiatrically for discharge.  Pt is not

## 2018-09-06 NOTE — ED PROVIDER NOTE - MEDICAL DECISION MAKING DETAILS
23 y/o male presents to the ED BIBPD asking for amplify due to missing previous dose. Pt appears depressed due to recent social stressor. Endorses cocaine but denies SI/HI at this time. Given presenting sx will clear for psych consult. 23 y/o male presents to the ED BIBPD asking for amplify due to missing previous dose. Pt appears depressed due to recent social stressor. Endorses cocaine but denies SI/HI at this time.  A/P Given PT BIB police, endorses cocaine and heroin use, recent psychiatric stressor breaking up with boyfriend, Hx MDD currently off medication, not forthcoming with history will medically clear for psychiatric evaluation. 23 y/o male BPD presents to the ED BIBPD asking for amplify due to missing previous dose. Pt appears depressed due to recent social stressor. Endorses cocaine but denies SI/HI at this time.  A/P Given PT BIB police, endorses cocaine and heroin use, recent psychiatric stressor breaking up with boyfriend, Hx MDD/BPD currently off medication, not forthcoming with history will medically clear for psychiatric evaluation.  Symptomatic Tx reassess.

## 2018-09-06 NOTE — ED BEHAVIORAL HEALTH ASSESSMENT NOTE - HPI (INCLUDE ILLNESS QUALITY, SEVERITY, DURATION, TIMING, CONTEXT, MODIFYING FACTORS, ASSOCIATED SIGNS AND SYMPTOMS)
Patient a 23 y/o cheung single male, unemployed, living [place] mother [place] after leaving pri residence when domiciled with partner, no prior SI/SA, hx of Cannabis/Ecstasy abuse, 3 prior psych admission 2 as child for cutting and behavior and one in June of this year, when he was diagnosed with ADHD, ADD, Bipolar disorder and Personality Disorder, no h/o SA but h/o cutting,  no medical issues was BIB/EMS for unclear reasons.  Police reports Pt needs "psychological eval" for missed meds.  Pt reports he called police to see if he has a warrant out fo his arrest.  He also says he was visiting  today and his mother called police because she does not him at the house anymore.  Pt refused to give details.    Pt reports his last psych admission was June at [facility], f/b 45 days in rehab in [place] and now for 2 past weeks in [facility] with 2 weeks remaining.  He reports he missed his dose of Abilify and came to ED to get missed dose.  He states he missed one session of his 3/5 per week sessions.   Pt reports was admitted to [facility] in June, after using large amt methamphetamine and wanted help for severe depression.  Pt denies SI/HI and acute depression.  Pt denies AH/delusions however presents hypomanic with dilated pupils.  Pt admits to using IV methamphetamine today and IV Heroin yesterday and there are notifiable track marks on left arm.  Pt denies need for return to rehab as plans on returning to IOP program tomorrow.  Pt states he wanted the Abilify tonight because it helps his mood when he using Methamphetamine.    Spoke to mother Arely who is in route to  to get Pt.  Mother reports h/o cutting and suicidal statements but not attempts and did not confirm psych admission at age 7.  Mother has no safety concerns if discharged.

## 2018-09-06 NOTE — ED PROVIDER NOTE - OBJECTIVE STATEMENT
22 y/op male presents to the ED BIBPD asking for amplify due to missing previous dose. Pt states he was at his boyfriends house, his mom did not want him there and said she would call the PD if he did not leave. Pt states he was upset because him and his boyfriend just broke up, he was crying and did not want to leave. Pt states he missed his one dose of Abilify and is requesting his 30mg dose here. Denies SI/HI or hx of SI/HI. States he is not a smoker, No illicit drug use. 23 y/o male presents to the ED BIBPD asking for amplify due to missing previous dose. Pt states he was at his boyfriends house, his mom did not want him there and said she would call the PD if he did not leave. Pt states he was upset because him and his boyfriend just broke up, he was crying and did not want to leave. Pt states he missed his one dose of Abilify and is requesting his 30mg dose here. Denies SI/HI or hx of SI/HI. Smoker. Endorses illicit drug use including cocaine and heroin. 21 y/o male Borderline Personality Disorder presents to the ED BIBPD asking for amplify due to missing previous dose. Pt states he was at his boyfriends house, his mom did not want him there and said she would call the PD if he did not leave. Pt states he was upset because him and his boyfriend just broke up, he was crying and did not want to leave. Pt states he missed his one dose of Abilify and is requesting his 30mg dose here. Denies SI/HI or hx of SI/HI. Smoker. Endorses illicit drug use including cocaine and heroin.  PT BIB police from mother's house hysterical.

## 2018-09-06 NOTE — ED BEHAVIORAL HEALTH ASSESSMENT NOTE - OTHER PAST PSYCHIATRIC HISTORY (INCLUDE DETAILS REGARDING ONSET, COURSE OF ILLNESS, INPATIENT/OUTPATIENT TREATMENT)
Previously diagnose with ADHD as a child and takes Strattera  Lithium/Trileptal/Abilify/Wellbutrin/   No prior SI/SA  but + h/o cutting  [facility] June 2018  [facility] following psych admission June-July 2018
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
## 2018-09-06 NOTE — ED BEHAVIORAL HEALTH ASSESSMENT NOTE - SUICIDE PROTECTIVE FACTORS
Positive therapeutic relationships/Responsibility to family and others/Supportive social network or family

## 2018-09-06 NOTE — ED PROVIDER NOTE - NS_ ATTENDINGSCRIBEDETAILS _ED_A_ED_FT
The scribe's documentation has been prepared under my direction and personally reviewed by me in its entirety.  I confirm that the note above accurately reflects all my work, treatment, procedures, and decision making except where otherwise noted or amended by me.  Efren Alvarez M.D.

## 2018-09-06 NOTE — ED BEHAVIORAL HEALTH ASSESSMENT NOTE - PRIMARY DX
Borderline personality disorder Bipolar disorder in full remission, most recent episode unspecified type

## 2018-09-06 NOTE — ED PROVIDER NOTE - PROGRESS NOTE DETAILS
Dominic DON for Dr. Alvarez: Requesting psychology consult. I Angelia Flowers attest that this documentation has been prepared under the direction and in the presence of Antonio. BLAYNE PT cleared by psychiatry.  No medical indications for admission.  Pt given medication in the ED.  I reviewed the alarm symptoms of this patient's diagnosis and discussed criteria for their return to the emergency department.  I instructed the patient to return to the emergency department with any alarm symptoms for their specific diagnosis including headache, vomiting, fevers, SI, HI, any worsening symptoms, and any other concerns.  I instructed this patient to call their primary doctor today, to inform them of their visit to the emergency department, and to obtain a repeat evaluation in the next 24 hours.  This patient understood and agreed with our plan for follow up.  At the time of discharge this patient remained in stable condition, in no acute distress, with stable vital signs.

## 2018-09-06 NOTE — ED BEHAVIORAL HEALTH ASSESSMENT NOTE - DESCRIPTION (FIRST USE, LAST USE, QUANTITY, FREQUENCY, DURATION)
1 PPD Since age 15 daily 2-3 times a week used today, approx 1x/month uses today used 0.5 Gm day IV Ecstasy past, methamphetamine IV

## 2018-09-07 PROBLEM — F60.3 BORDERLINE PERSONALITY DISORDER: Chronic | Status: ACTIVE | Noted: 2018-06-22

## 2018-10-12 NOTE — ED BEHAVIORAL HEALTH ASSESSMENT NOTE - HPI (INCLUDE ILLNESS QUALITY, SEVERITY, DURATION, TIMING, CONTEXT, MODIFYING FACTORS, ASSOCIATED SIGNS AND SYMPTOMS)
23 yo M with a reported history of borderline personality disorder and crystal methamphetamine use disorder presents to the ED with complaints of paranoia; in the context of likely crystal meth intoxication. Exam findings are severely limited as patient is uncooperative, turning off the telepsychiatry device upon being seen on screen.     Prior records and collateral note reviewed: police was due to the patient making delusional statement and then making homicidal threats towards his ex-boyfriend and his ex-boyfriends mother. Collateral obtained from his mother which notes that he has been having paranoid delusions, auditory hallucinations and becoming increasingly violent. Other comments reported from the collateral note include his paranoia of being followed, having his phone lines tapped and accusations of his ex-boyfriend of working for the [employer], all leading to homicidal threats toward them. 12-Oct-2018 07:20

## 2020-03-10 NOTE — ED ADULT NURSE NOTE - NS ED NURSE DISCH DISPOSITION
Transferred

## 2020-03-22 ENCOUNTER — EMERGENCY (EMERGENCY)
Facility: HOSPITAL | Age: 25
LOS: 0 days | Discharge: HOME | End: 2020-03-22
Attending: STUDENT IN AN ORGANIZED HEALTH CARE EDUCATION/TRAINING PROGRAM
Payer: COMMERCIAL

## 2020-03-22 VITALS — SYSTOLIC BLOOD PRESSURE: 148 MMHG | HEART RATE: 102 BPM | DIASTOLIC BLOOD PRESSURE: 89 MMHG

## 2020-03-22 VITALS — WEIGHT: 210.1 LBS | HEIGHT: 72 IN

## 2020-03-22 DIAGNOSIS — R07.0 PAIN IN THROAT: ICD-10-CM

## 2020-03-22 DIAGNOSIS — R00.0 TACHYCARDIA, UNSPECIFIED: ICD-10-CM

## 2020-03-22 DIAGNOSIS — R09.89 OTHER SPECIFIED SYMPTOMS AND SIGNS INVOLVING THE CIRCULATORY AND RESPIRATORY SYSTEMS: ICD-10-CM

## 2020-03-22 DIAGNOSIS — M54.2 CERVICALGIA: ICD-10-CM

## 2020-03-22 DIAGNOSIS — R13.10 DYSPHAGIA, UNSPECIFIED: ICD-10-CM

## 2020-03-22 DIAGNOSIS — Z11.2 ENCOUNTER FOR SCREENING FOR OTHER BACTERIAL DISEASES: ICD-10-CM

## 2020-03-22 LAB — S PYO AG SPEC QL IA: NEGATIVE — SIGNIFICANT CHANGE UP

## 2020-03-22 PROCEDURE — 87880 STREP A ASSAY W/OPTIC: CPT

## 2020-03-22 PROCEDURE — 87081 CULTURE SCREEN ONLY: CPT

## 2020-03-22 PROCEDURE — 71046 X-RAY EXAM CHEST 2 VIEWS: CPT

## 2020-03-22 PROCEDURE — 93010 ELECTROCARDIOGRAM REPORT: CPT

## 2020-03-22 PROCEDURE — 71046 X-RAY EXAM CHEST 2 VIEWS: CPT | Mod: 26

## 2020-03-22 PROCEDURE — 93005 ELECTROCARDIOGRAM TRACING: CPT

## 2020-03-22 PROCEDURE — 99285 EMERGENCY DEPT VISIT HI MDM: CPT

## 2020-03-22 PROCEDURE — 99283 EMERGENCY DEPT VISIT LOW MDM: CPT | Mod: 25

## 2020-03-22 NOTE — ED STATDOCS - ATTENDING CONTRIBUTION TO CARE
I, Danielle Stratton DO,  performed the initial face to face bedside interview with this patient regarding history of present illness, review of symptoms and relevant past medical, social and family history.  I completed an independent physical examination.  I was the initial provider who evaluated this patient. I have signed out the follow up of any pending tests (i.e. labs, radiological studies) to the ACP.  I have communicated the patient’s plan of care and disposition with the ACP.  The history, relevant review of systems, past medical and surgical history, medical decision making, and physical examination was documented by the scribe in my presence and I attest to the accuracy of the documentation.

## 2020-03-22 NOTE — ED STATDOCS - ENMT, MLM
Nasal mucosa clear.  Mouth with normal mucosa  +Tenderness to tonsillar nodes. Throat has no vesicles, no oropharyngeal exudates and uvula is midline.

## 2020-03-22 NOTE — ED STATDOCS - NSFOLLOWUPINSTRUCTIONS_ED_ALL_ED_FT
Theresa Perkins    Sore Throat  A sore throat is pain, burning, irritation, or scratchiness in the throat. When you have a sore throat, you may feel pain or tenderness in your throat when you swallow or talk.  Many things can cause a sore throat, including:  An infection.Seasonal allergies.Dryness in the air.Irritants, such as smoke or pollution.Radiation treatment to the area.Gastroesophageal reflux disease (GERD).A tumor.A sore throat is often the first sign of another sickness. It may happen with other symptoms, such as coughing, sneezing, fever, and swollen neck glands. Most sore throats go away without medical treatment.  Follow these instructions at home:      Take over-the-counter medicines only as told by your health care provider.   If your child has a sore throat, do not give your child aspirin because of the association with Reye syndrome.Drink enough fluids to keep your urine pale yellow.Rest as needed.To help with pain, try:  Sipping warm liquids, such as broth, herbal tea, or warm water.Eating or drinking cold or frozen liquids, such as frozen ice pops.Gargling with a salt-water mixture 3–4 times a day or as needed. To make a salt-water mixture, completely dissolve ½–1 tsp (3–6 g) of salt in 1 cup (237 mL) of warm water.Sucking on hard candy or throat lozenges.Putting a cool-mist humidifier in your bedroom at night to moisten the air.Sitting in the bathroom with the door closed for 5–10 minutes while you run hot water in the shower.Do not use any products that contain nicotine or tobacco, such as cigarettes, e-cigarettes, and chewing tobacco. If you need help quitting, ask your health care provider.Wash your hands well and often with soap and water. If soap and water are not available, use hand .Contact a health care provider if:  You have a fever for more than 2–3 days.You have symptoms that last (are persistent) for more than 2–3 days.Your throat does not get better within 7 days.You have a fever and your symptoms suddenly get worse.Your child who is 3 months to 3 years old has a temperature of 102.2°F (39°C) or higher.Get help right away if:  You have difficulty breathing.You cannot swallow fluids, soft foods, or your saliva.You have increased swelling in your throat or neck.You have persistent nausea and vomiting.Summary  A sore throat is pain, burning, irritation, or scratchiness in the throat. Many things can cause a sore throat.Take over-the-counter medicines only as told by your health care provider. Do not give your child aspirin.Drink plenty of fluids, and rest as needed.Contact a health care provider if your symptoms worsen or your sore throat does not get better within 7 days.This information is not intended to replace advice given to you by your health care provider. Make sure you discuss any questions you have with your health care provider.    Rest. Drink plenty of fluids. Return for worsening symptoms. Follow up with PMD.

## 2020-03-22 NOTE — ED STATDOCS - OBJECTIVE STATEMENT
24 YOM PMHx borderline personality disorder on Guanfacine presents to ED for foreign body sensation since last night. Pt feels this sensation radiating from throat to his chest. Pt with history of rehab for meth abuse. No recent travel. No COVID contact. Denies fever, cough, wheezing, dyspnea, SOB, N/V/D. Pt from [place], does not have PCP. 24 YOM PMHx borderline personality disorder presents to ED for foreign body sensation since last night. Pt feels this sensation radiating from throat to his chest. Pt with history of rehab for meth abuse. No recent travel. No COVID contact. Denies fever, cough, wheezing, dyspnea, SOB, N/V/D. Pt from [place], does not have PCP.

## 2020-03-22 NOTE — ED STATDOCS - PROGRESS NOTE DETAILS
24 yr. old male PMH: Bipolar, Recently in Rehab for Crystal Meth presents to ED with feeling of something in throat and upper chest upon swallowing. Has difficulty swallowing but eating and drinking normally. No fever or chills. No N/V/D. No recent travel No contact with +Covid patient. Seen and examined by attending in intake. Plan: EKG, Chest X-ray and Rapid strep. Will F/U with results and re evaluate. Ely NP PO challenge tolerated well. Ely NP Results of Lab work neg strep, EKG and Chest X-Ray reviewed with patient. Instructed top F/U with PMD and return for worsening symptoms. Ely WRIGHT

## 2020-03-22 NOTE — ED STATDOCS - CARE PROVIDER_API CALL
Co, Danny DAS)  Internal Medicine  [address] [place] [address]  Phone: [phone]  Fax: [phone]  Follow Up Time:

## 2020-03-22 NOTE — ED ADULT TRIAGE NOTE - CHIEF COMPLAINT QUOTE
pt ambulatory to ED for eval of throat pain and phlegm since last night. no resp distress. no angioedema noted.

## 2020-03-22 NOTE — ED ADULT NURSE NOTE - NSIMPLEMENTINTERV_GEN_ALL_ED
Implemented All Universal Safety Interventions:  [place] to call system. Call bell, personal items and telephone within reach. Instruct patient to call for assistance. Room bathroom lighting operational. Non-slip footwear when patient is off stretcher. Physically safe environment: no spills, clutter or unnecessary equipment. Stretcher in lowest position, wheels locked, appropriate side rails in place.

## 2020-03-22 NOTE — ED STATDOCS - PATIENT PORTAL LINK FT
You can access the FollowMyHealth Patient Portal offered by [facility] by registering at the following website: [email or web address]. By joining [employer]’s FollowMyHealth portal, you will also be able to view your health information using other applications (apps) compatible with our system.

## 2020-06-15 NOTE — ED PROVIDER NOTE - CROS ED ROS STATEMENT
[de-identified] : Her BP at home is running in the 120-140/70-80 range.  
all other ROS negative except as per HPI

## 2020-09-10 ENCOUNTER — INPATIENT (INPATIENT)
Facility: HOSPITAL | Age: 25
LOS: 0 days | Discharge: HOME | DRG: 313 | End: 2020-09-11
Attending: FAMILY MEDICINE | Admitting: FAMILY MEDICINE
Payer: COMMERCIAL

## 2020-09-10 VITALS
SYSTOLIC BLOOD PRESSURE: 131 MMHG | TEMPERATURE: 98 F | HEART RATE: 108 BPM | DIASTOLIC BLOOD PRESSURE: 74 MMHG | RESPIRATION RATE: 19 BRPM | OXYGEN SATURATION: 99 %

## 2020-09-10 DIAGNOSIS — R07.89 OTHER CHEST PAIN: ICD-10-CM

## 2020-09-10 DIAGNOSIS — I25.2 OLD MYOCARDIAL INFARCTION: ICD-10-CM

## 2020-09-10 DIAGNOSIS — R07.9 CHEST PAIN, UNSPECIFIED: ICD-10-CM

## 2020-09-10 LAB
ADD ON TEST-SPECIMEN IN LAB: SIGNIFICANT CHANGE UP
ALBUMIN SERPL ELPH-MCNC: 3.9 G/DL — SIGNIFICANT CHANGE UP (ref 3.3–5)
ALP SERPL-CCNC: 109 U/L — SIGNIFICANT CHANGE UP (ref 40–120)
ALT FLD-CCNC: 91 U/L — HIGH (ref 12–78)
ANION GAP SERPL CALC-SCNC: 2 MMOL/L — LOW (ref 5–17)
APTT BLD: 35.7 SEC — HIGH (ref 27.5–35.5)
AST SERPL-CCNC: 28 U/L — SIGNIFICANT CHANGE UP (ref 15–37)
BASOPHILS # BLD AUTO: 0.04 K/UL — SIGNIFICANT CHANGE UP (ref 0–0.2)
BASOPHILS NFR BLD AUTO: 0.5 % — SIGNIFICANT CHANGE UP (ref 0–2)
BILIRUB SERPL-MCNC: 0.5 MG/DL — SIGNIFICANT CHANGE UP (ref 0.2–1.2)
BUN SERPL-MCNC: 14 MG/DL — SIGNIFICANT CHANGE UP (ref 7–23)
CALCIUM SERPL-MCNC: 8.9 MG/DL — SIGNIFICANT CHANGE UP (ref 8.5–10.1)
CHLORIDE SERPL-SCNC: 106 MMOL/L — SIGNIFICANT CHANGE UP (ref 96–108)
CO2 SERPL-SCNC: 30 MMOL/L — SIGNIFICANT CHANGE UP (ref 22–31)
CREAT SERPL-MCNC: 1 MG/DL — SIGNIFICANT CHANGE UP (ref 0.5–1.3)
EOSINOPHIL # BLD AUTO: 0.26 K/UL — SIGNIFICANT CHANGE UP (ref 0–0.5)
EOSINOPHIL NFR BLD AUTO: 3.2 % — SIGNIFICANT CHANGE UP (ref 0–6)
GLUCOSE SERPL-MCNC: 86 MG/DL — SIGNIFICANT CHANGE UP (ref 70–99)
HCT VFR BLD CALC: 43.6 % — SIGNIFICANT CHANGE UP (ref 39–50)
HGB BLD-MCNC: 14.6 G/DL — SIGNIFICANT CHANGE UP (ref 13–17)
IMM GRANULOCYTES NFR BLD AUTO: 0.4 % — SIGNIFICANT CHANGE UP (ref 0–1.5)
INR BLD: 1.14 RATIO — SIGNIFICANT CHANGE UP (ref 0.88–1.16)
LYMPHOCYTES # BLD AUTO: 2.27 K/UL — SIGNIFICANT CHANGE UP (ref 1–3.3)
LYMPHOCYTES # BLD AUTO: 27.8 % — SIGNIFICANT CHANGE UP (ref 13–44)
MCHC RBC-ENTMCNC: 28.8 PG — SIGNIFICANT CHANGE UP (ref 27–34)
MCHC RBC-ENTMCNC: 33.5 GM/DL — SIGNIFICANT CHANGE UP (ref 32–36)
MCV RBC AUTO: 86 FL — SIGNIFICANT CHANGE UP (ref 80–100)
MONOCYTES # BLD AUTO: 0.54 K/UL — SIGNIFICANT CHANGE UP (ref 0–0.9)
MONOCYTES NFR BLD AUTO: 6.6 % — SIGNIFICANT CHANGE UP (ref 2–14)
NEUTROPHILS # BLD AUTO: 5.02 K/UL — SIGNIFICANT CHANGE UP (ref 1.8–7.4)
NEUTROPHILS NFR BLD AUTO: 61.5 % — SIGNIFICANT CHANGE UP (ref 43–77)
PLATELET # BLD AUTO: 253 K/UL — SIGNIFICANT CHANGE UP (ref 150–400)
POTASSIUM SERPL-MCNC: 4.3 MMOL/L — SIGNIFICANT CHANGE UP (ref 3.5–5.3)
POTASSIUM SERPL-SCNC: 4.3 MMOL/L — SIGNIFICANT CHANGE UP (ref 3.5–5.3)
PROT SERPL-MCNC: 7.6 GM/DL — SIGNIFICANT CHANGE UP (ref 6–8.3)
PROTHROM AB SERPL-ACNC: 13.2 SEC — SIGNIFICANT CHANGE UP (ref 10.6–13.6)
RBC # BLD: 5.07 M/UL — SIGNIFICANT CHANGE UP (ref 4.2–5.8)
RBC # FLD: 11.9 % — SIGNIFICANT CHANGE UP (ref 10.3–14.5)
SARS-COV-2 RNA SPEC QL NAA+PROBE: SIGNIFICANT CHANGE UP
SODIUM SERPL-SCNC: 138 MMOL/L — SIGNIFICANT CHANGE UP (ref 135–145)
TROPONIN I SERPL-MCNC: <0.015 NG/ML — SIGNIFICANT CHANGE UP (ref 0.01–0.04)
WBC # BLD: 8.16 K/UL — SIGNIFICANT CHANGE UP (ref 3.8–10.5)
WBC # FLD AUTO: 8.16 K/UL — SIGNIFICANT CHANGE UP (ref 3.8–10.5)

## 2020-09-10 PROCEDURE — 80307 DRUG TEST PRSMV CHEM ANLYZR: CPT

## 2020-09-10 PROCEDURE — 86769 SARS-COV-2 COVID-19 ANTIBODY: CPT

## 2020-09-10 PROCEDURE — G0378: CPT

## 2020-09-10 PROCEDURE — 93010 ELECTROCARDIOGRAM REPORT: CPT

## 2020-09-10 PROCEDURE — G0008: CPT

## 2020-09-10 PROCEDURE — 36415 COLL VENOUS BLD VENIPUNCTURE: CPT

## 2020-09-10 PROCEDURE — 90686 IIV4 VACC NO PRSV 0.5 ML IM: CPT

## 2020-09-10 PROCEDURE — 71045 X-RAY EXAM CHEST 1 VIEW: CPT | Mod: 26

## 2020-09-10 PROCEDURE — 93005 ELECTROCARDIOGRAM TRACING: CPT

## 2020-09-10 PROCEDURE — 99233 SBSQ HOSP IP/OBS HIGH 50: CPT

## 2020-09-10 PROCEDURE — 99255 IP/OBS CONSLTJ NEW/EST HI 80: CPT

## 2020-09-10 PROCEDURE — 84484 ASSAY OF TROPONIN QUANT: CPT

## 2020-09-10 PROCEDURE — 99223 1ST HOSP IP/OBS HIGH 75: CPT

## 2020-09-10 RX ORDER — SODIUM CHLORIDE 9 MG/ML
500 INJECTION INTRAMUSCULAR; INTRAVENOUS; SUBCUTANEOUS
Refills: 0 | Status: DISCONTINUED | OUTPATIENT
Start: 2020-09-10 | End: 2020-09-10

## 2020-09-10 RX ORDER — GUANFACINE 3 MG/1
1 TABLET, EXTENDED RELEASE ORAL
Qty: 0 | Refills: 0 | DISCHARGE

## 2020-09-10 RX ORDER — LAMOTRIGINE 25 MG/1
150 TABLET, ORALLY DISINTEGRATING ORAL DAILY
Refills: 0 | Status: DISCONTINUED | OUTPATIENT
Start: 2020-09-10 | End: 2020-09-11

## 2020-09-10 RX ORDER — LAMOTRIGINE 25 MG/1
1 TABLET, ORALLY DISINTEGRATING ORAL
Qty: 0 | Refills: 0 | DISCHARGE

## 2020-09-10 RX ORDER — INFLUENZA VIRUS VACCINE 15; 15; 15; 15 UG/.5ML; UG/.5ML; UG/.5ML; UG/.5ML
0.5 SUSPENSION INTRAMUSCULAR ONCE
Refills: 0 | Status: COMPLETED | OUTPATIENT
Start: 2020-09-10 | End: 2020-09-11

## 2020-09-10 RX ORDER — ASPIRIN/CALCIUM CARB/MAGNESIUM 324 MG
162 TABLET ORAL ONCE
Refills: 0 | Status: COMPLETED | OUTPATIENT
Start: 2020-09-10 | End: 2020-09-10

## 2020-09-10 RX ORDER — ARIPIPRAZOLE 15 MG/1
1 TABLET ORAL
Qty: 0 | Refills: 0 | DISCHARGE

## 2020-09-10 RX ADMIN — Medication 162 MILLIGRAM(S): at 11:51

## 2020-09-10 NOTE — CONSULT NOTE ADULT - SUBJECTIVE AND OBJECTIVE BOX
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  He describes the onset of discomfort in his anterior chest that started last night when he was in bed -- describes as the sensation of a "golf ball" in his chest with pressure, "gurgling," and tingling -- tingling in his chest and throughout his "whole body."  He is unable to identify exacerbating or alleviating factors; no associated dyspnea.  He describes abnormal motion of a heart wall and is scheduled for a cardiac CT tomorrow at [facility].      PAST MEDICAL & SURGICAL HISTORY:  As above in HPI  No significant past surgical history    SOCIAL HISTORY:   Smoking: Current smoker  Drug Use: None recent    FAMILY HISTORY:  No pertinent family history in first degree relatives    Home Medications: None    Allergies:  No Known Allergies    IREVIEW OF SYSTEMS:  CONSTITUTIONAL: No weakness, fevers or chills  Eyes: No visual changes  NECK: No pain or stiffness  RESPIRATORY: No cough, wheezing, hemoptysis; No shortness of breath  CARDIOVASCULAR: + chest pain   GASTROINTESTINAL: No nausea, vomiting.  GENITOURINARY: No dysuria, frequency or hematuria  NEUROLOGICAL: No numbness.  SKIN: No itching or rash  All other review of systems is negative unless indicated above    Vital Signs Last 24 Hrs  T(C): 36.7 (10 Sep 2020 15:27), Max: 36.9 (10 Sep 2020 10:49)  T(F): 98.1 (10 Sep 2020 15:27), Max: 98.5 (10 Sep 2020 10:49)  HR: 90 (10 Sep 2020 15:27) (90 - 108)  BP: 115/81 (10 Sep 2020 15:27) (115/81 - 131/74)  BP(mean): 92 (10 Sep 2020 15:27) (89 - 92)  RR: 14 (10 Sep 2020 15:27) (14 - 19)  SpO2: 97% (10 Sep 2020 15:27) (97% - 99%)    PHYSICAL EXAM:  Constitutional: NAD, awake and alert, overweight  HEENT:   No oral cyanosis.  Pulmonary: Non-labored, breath sounds are clear bilaterally, No wheezing, rales or rhonchi  Cardiovascular: S1 and S2, regular rate and rhythm, no Murmurs, gallops or rubs  Gastrointestinal: Bowel Sounds present, soft, nontender.   Lymph: No peripheral edema. No cervical lymphadenopathy.  Neurological: Alert, no focal deficits  Skin: No rashes. + skin lesions on arms  Psych:  Mood & affect appropriate    LABS:                      14.6   8.16  )-----------( 253      ( 10 Sep 2020 13:17 )             43.6     138    |  106    |  14     ----------------------------<  86     4.3     |  30     |  1.00     Ca    8.9        10 Sep 2020 13:17  TPro  7.6    /  Alb  3.9    /  TBili  0.5    /  DBili  x      /  AST  28     /  ALT  91     /  AlkPhos  109    10 Sep 2020 13:17  PT/INR - ( 10 Sep 2020 13:17 )   PT: 13.2 sec;   INR: 1.14 ratio    PTT - ( 10 Sep 2020 13:17 )  PTT:35.7 sec    CARDIAC MARKERS ( 10 Sep 2020 17:18 ) <0.015 ng/mL / x     / x     / x     / x      CARDIAC MARKERS ( 10 Sep 2020 15:24 ) <0.015 ng/mL / x     / x     / x     / x      CARDIAC MARKERS ( 10 Sep 2020 13:17 ) <0.015 ng/mL / x     / x     / x     / x        Xray Chest 1 View AP/PA. (09.10.20 @ 11:46):  Heart size is within normal limits. The lung fields and pleural surfaces are unremarkable. Chest is similar to March 22 of this year.    ECG: Normal sinus rhythm

## 2020-09-10 NOTE — H&P ADULT - HISTORY OF PRESENT ILLNESS
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 As per Cardiology the patient was scheduled for a cardiac CT tomorrow at [facility]. The patient notes that his symptoms began last night while attempting to go to sleep. He described his symptoms as intermittent in nature and was associated with palpitations, diaphoresis, and a non-productive cough.

## 2020-09-10 NOTE — ED PROVIDER NOTE - OBJECTIVE STATEMENT
23 y/o male with hx of Borderline personality disorder and MI due to drug abuse presents to the ED c/o pressure in the left chest area. Onset last night. Pt was trying to go to sleep, but felt body getting "numb and tingly." Pt described the pressure as moderate, waxing and waning, non- pleuritic. Palpitations described as "fluttering" + associated diaphoresis. +Nausea but no vomiting. Pt has nonproductive cough starting last night. No known COVID exposure. NKDA 23 y/o male with hx of Borderline personality disorder and MI at age 20 due to meth use presents to the ED c/o pressure in the left chest area. Onset last night. Pt was trying to go to sleep, but felt body getting "numb and tingly." Pt described the pressure as moderate, waxing and waning, non- pleuritic. Palpitations described as "fluttering" + associated diaphoresis. +Nausea but no vomiting. Pt has nonproductive cough starting last night. pt scheduled for CT angio tomorrow at outpatient clinic. No known COVID exposure. Pt NKDA  Cardiologist: Priya

## 2020-09-10 NOTE — ED ADULT NURSE NOTE - OBJECTIVE STATEMENT
Pt comes to the ED complaining of left chest pressure and "tingling, numbness and jolting" of his body when he goes to sleep. Pt states that he had and MI at the age of 20 secondary to crystal meth use. Pt states that he was in rehab approx 1 year ago because his use was "very bad". Pt states that prior to rehab, he would use everyday, multiple times a day, Pt states that he no longer uses everyday but that he does use every once in a while. Pt states that last use was 2 weeks ago.

## 2020-09-10 NOTE — ED STATDOCS - PROGRESS NOTE DETAILS
Dominic SANTILLAN for ED attending Dr. Dee: 25 y/o male with PMHx of MI at age 20, under cardiology care, presents to ED c/o SOB and sharp chest pain since last night. States he had an episode of tingling sensation in body lasting approx. 5 minutes. Pt had MI secondary to using narcotics, denies narcotic use recently. Cardiologist: Dr. Andrew in [place]. Will send pt to main ED for further evaluation.

## 2020-09-10 NOTE — CONSULT NOTE ADULT - PROBLEM SELECTOR RECOMMENDATION 9
Nonanginal chest pain with normal ECG and normal serial assays of troponin;  recommend repeat ECG and cardiac CT as planned by his outpatient cardiologist - not available in [facility].

## 2020-09-10 NOTE — H&P ADULT - NSHPPHYSICALEXAM_GEN_ALL_CORE
Vital Signs Last 24 Hrs  T(C): 36.7 (10 Sep 2020 15:27), Max: 36.9 (10 Sep 2020 10:49)  T(F): 98.1 (10 Sep 2020 15:27), Max: 98.5 (10 Sep 2020 10:49)  HR: 95 (10 Sep 2020 20:02) (90 - 108)  BP: 112/75 (10 Sep 2020 20:02) (112/75 - 131/74)  BP(mean): 92 (10 Sep 2020 15:27) (89 - 92)  RR: 15 (10 Sep 2020 20:02) (14 - 19)  SpO2: 97% (10 Sep 2020 20:02) (97% - 99%)

## 2020-09-10 NOTE — ED PROVIDER NOTE - CLINICAL SUMMARY MEDICAL DECISION MAKING FREE TEXT BOX
25 y/o male s/p MI age 20 secondary to meth/cocaine abuse, reported poor LVEF. Awaiting outpatient coronary CTA for intermittent CP episode. Ambulatory to ED c/o of L Chest pressure persistent since last tight. Associated palpitations, nausea, SOB, lightheaded. Plan EKG CXR, labs including COVID swab, serum troponin, ASA, monitor, expect admission.    EKG

## 2020-09-10 NOTE — ED ADULT NURSE NOTE - CHPI ED NUR SYMPTOMS NEG
no back pain/no chills/no fever/no dizziness/no syncope/no shortness of breath/no congestion/no diaphoresis/no nausea/no vomiting

## 2020-09-10 NOTE — H&P ADULT - ASSESSMENT
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 As per Cardiology the patient was scheduled for a cardiac CT tomorrow at [facility]. The patient notes that his symptoms began last night while attempting to go to sleep. He described his symptoms as intermittent in nature and was associated with palpitations, diaphoresis, and a non-productive cough.    #Recurrent Chest Pain  ~admit to Telemetry  ~per Cardiology patient with normal ECG and normal serial assays of troponin  ~repeat ECG and cardiac CT as planned by his outpatient cardiologist - not available in [facility].    #History of Methamphetamine Induced Myocardial Infarction 4 years ago  ~f/u Urine toxicology    #Bipolar Disorder  ~cont. Lamictal 150mg po daily  ~takes Abilify IM qmonthly    #Vte ppx  ~IMPROVE Vte Risk Score is 0  ~encourage ambulation

## 2020-09-11 VITALS
HEART RATE: 73 BPM | DIASTOLIC BLOOD PRESSURE: 51 MMHG | OXYGEN SATURATION: 97 % | SYSTOLIC BLOOD PRESSURE: 103 MMHG | TEMPERATURE: 98 F | RESPIRATION RATE: 18 BRPM

## 2020-09-11 LAB — PCP SPEC-MCNC: SIGNIFICANT CHANGE UP

## 2020-09-11 PROCEDURE — 93010 ELECTROCARDIOGRAM REPORT: CPT

## 2020-09-11 PROCEDURE — 99238 HOSP IP/OBS DSCHRG MGMT 30/<: CPT

## 2020-09-11 RX ADMIN — LAMOTRIGINE 150 MILLIGRAM(S): 25 TABLET, ORALLY DISINTEGRATING ORAL at 10:01

## 2020-09-11 RX ADMIN — INFLUENZA VIRUS VACCINE 0.5 MILLILITER(S): 15; 15; 15; 15 SUSPENSION INTRAMUSCULAR at 10:17

## 2020-09-11 NOTE — DISCHARGE NOTE PROVIDER - NSDCFUADDINST_GEN_ALL_CORE_FT
PCP- follow up in 1 week. Bring these papers with you to that appointment so your PCP can request records from your hospital stay.    **DO NOT DO AMPHETAMINES**

## 2020-09-11 NOTE — PROGRESS NOTE ADULT - ATTENDING COMMENTS
reviewed/agree w/ above and changes made where necessary.  OUtpatient follow up Cardiac CT and w/ Cardio to review echo/meds.

## 2020-09-11 NOTE — DISCHARGE NOTE PROVIDER - CARE PROVIDERS DIRECT ADDRESSES
,[email or web address]

## 2020-09-11 NOTE — DISCHARGE NOTE PROVIDER - PROVIDER TOKENS
PROVIDER:[TOKEN:[[id number]]] PROVIDER:[TOKEN:[[id number]]],PROVIDER:[TOKEN:[[id number]]]

## 2020-09-11 NOTE — DISCHARGE NOTE PROVIDER - HOSPITAL COURSE
ASSESSMENT AND PLAN:        19 y/o male w/ pmhx of MI due to methamphetamine abuse, and borderline personality presenting to ED for eval of intermittent chest pain, palpitations, diaphoresis and nausea while trying to sleep.         1) chest pain r/o ACS     - troponin x3 negative, ecg reviewed. cleared by cardio for d/c. nonspecific t wave changes but no significant. sinus tach noted.    - urine drug screen positive for methamphetamine. advised to quit.    - cxr negative for acute findings     - cardio recc - outpatient cardiac CT and follow up.         2) borderline personality disorder     - cont with Lamictal         3) Hx MI    - reports EF fuction reduced at 35% not on meds??    - due to follow up wtih Dr. amanda but has been postponing bec/ of work        D/c home. patient to follow up with his own cardio - dr. amanda for cardiac CT ASSESSMENT AND PLAN:        21 y/o male w/ pmhx of MI due to methamphetamine abuse, and borderline personality presenting to ED for eval of intermittent chest pain, palpitations, diaphoresis and nausea while trying to sleep.         1) chest pain - resolved    unlikely acs-->  troponin x3 negative, ecg reviewed. cleared by cardio for d/c. nonspecific t wave changes but no significant. sinus tach noted.    - urine drug screen positive for methamphetamine. advised to quit.    - cxr negative for acute findings     - cardio recc - outpatient cardiac CT and follow up.           2) borderline personality disorder     - cont with Lamictal         3) Hx MI    - reports EF fuction reduced at 35% not on meds??    - due to follow up wtih Dr. amanda but has been postponing bec/ of work        D/c home. patient to follow up with his own cardio - dr. amanda for cardiac CT

## 2020-09-11 NOTE — DISCHARGE NOTE NURSING/CASE MANAGEMENT/SOCIAL WORK - PATIENT PORTAL LINK FT
You can access the FollowMyHealth Patient Portal offered by [facility] by registering at the following website: [email or web address]. By joining [email or web address] FollowMyHealth portal, you will also be able to view your health information using other applications (apps) compatible with our system.

## 2020-09-11 NOTE — DISCHARGE NOTE PROVIDER - CARE PROVIDER_API CALL
Jose F Kumar  CARDIOVASCULAR DISEASE  [address] [place] [address]  Phone: [phone]  Fax: [phone]  Follow Up Time: Jose F Kumar  CARDIOVASCULAR DISEASE  [address] [place] [address]  Phone: [phone]  Fax: [phone]  Follow Up Time:     Adam Andrew  CARDIOVASCULAR DISEASE  [address] [place] [address]  Phone: [phone]  Fax: [phone]  Follow Up Time:

## 2020-09-11 NOTE — DISCHARGE NOTE PROVIDER - NSDCCPCAREPLAN_GEN_ALL_CORE_FT
PRINCIPAL DISCHARGE DIAGNOSIS  Diagnosis: Chest pain  Assessment and Plan of Treatment: You were admitted due to chest pain  - Your cardiac enzymes are normal, your ekg   - Continue to maintain a low sodium and low cholesterol diet  - STOP doing Amphetamines   - Follow up with your cardiologist Dr Ocampo in 1 week for a Cardiac CT - Call to make an appointment. PRINCIPAL DISCHARGE DIAGNOSIS  Diagnosis: Chest pain  Assessment and Plan of Treatment: You were admitted due to chest pain  - Your cardiac enzymes are normal  - Continue to maintain a low sodium and low cholesterol diet  - STOP doing Amphetamines   - Follow up with your cardiologist Dr. House or Dr. Kumar in 1 week for a Cardiac CT - Call to make an appointment. PRINCIPAL DISCHARGE DIAGNOSIS  Diagnosis: Chest pain  Assessment and Plan of Treatment: You were admitted due to chest pain  - Your cardiac enzymes are normal  - Continue to maintain a low sodium and low cholesterol diet  - STOP doing Amphetamines   - Follow up with your cardiologist Dr. House or Dr. Kumar in 1 week for a Cardiac CT, have echo and review meds - Call to make an appointment.  - Return to ED if chest pain recurs.

## 2020-09-11 NOTE — PROGRESS NOTE ADULT - ASSESSMENT
21 y/o male w/ pmhx of MI due to methamphetamine abuse, and borderline personality presenting to ED for eval of intermittent chest pain, palpitations, diaphoresis and nausea while trying to sleep.     1) chest pain r/o ACS likely due to illicit drug use   - troponin x3 negative   - urine drug screen positive for methamphetamine  - SR 70s on tele monitor  - cxr negative for acute findings   - cardio recc - outpatient cardiac CT and follow up.     2) borderline personality disorder   - cont with Lamictal

## 2020-09-11 NOTE — PROGRESS NOTE ADULT - SUBJECTIVE AND OBJECTIVE BOX
CHIEF COMPLAINT/DIAGNOSIS: chest pain 	    HPI: 25 y/o male w/ pmhx of MI at 20 due to methamphetamine use and borderline personality disorder ,presenting for eval of chest pain, palpitations and cough last night while trying to sleep.      SUBJECTIVE: chest pain, palpitations and nausea.     9/11 -       REVIEW OF SYSTEMS:  All other review of systems is negative unless indicated above        PHYSICAL EXAM:  Constitutional: NAD, awake and alert, well-developed  HEENT: PERR, EOMI, Normal Hearing, MMM  Neck: Soft and supple, No LAD, No JVD  Respiratory: Breath sounds are clear bilaterally, No wheezing, rales or rhonchi  Cardiovascular: S1 and S2, regular rate and rhythm, no Murmurs, gallops or rubs  Gastrointestinal: Bowel Sounds present, soft, nontender, nondistended, no guarding, no rebound  Extremities: No peripheral edema  Vascular: 2+ peripheral pulses  Neurological: A/O x 3, no focal deficits  Musculoskeletal: 5/5 strength b/l upper and lower extremities  Skin: No rashes      Vital Signs Last 24 Hrs  T(C): 36.8 (11 Sep 2020 07:18), Max: 36.9 (10 Sep 2020 10:49)  T(F): 98.2 (11 Sep 2020 07:18), Max: 98.5 (10 Sep 2020 10:49)  HR: 73 (11 Sep 2020 07:18) (73 - 108)  BP: 103/51 (11 Sep 2020 07:18) (103/51 - 131/74)  BP(mean): 92 (10 Sep 2020 15:27) (89 - 92)  RR: 18 (11 Sep 2020 07:18) (14 - 19)  SpO2: 97% (11 Sep 2020 07:18) (97% - 100%)      LABS: All Labs Reviewed:                        14.6   8.16  )-----------( 253      ( 10 Sep 2020 13:17 )             43.6     09-10    138  |  106  |  14  ----------------------------<  86  4.3   |  30  |  1.00    Ca    8.9      10 Sep 2020 13:17    TPro  7.6  /  Alb  3.9  /  TBili  0.5  /  DBili  x   /  AST  28  /  ALT  91<H>  /  AlkPhos  109  09-10    PT/INR - ( 10 Sep 2020 13:17 )   PT: 13.2 sec;   INR: 1.14 ratio     PTT - ( 10 Sep 2020 13:17 )  PTT:35.7 sec  CARDIAC MARKERS ( 10 Sep 2020 17:18 )  <0.015 ng/mL / x     / x     / x     / x      CARDIAC MARKERS ( 10 Sep 2020 15:24 )  <0.015 ng/mL / x     / x     / x     / x      CARDIAC MARKERS ( 10 Sep 2020 13:17 )  <0.015 ng/mL / x     / x     / x     / x          RADIOLOGY:  < from: Xray Chest 1 View AP/PA. (09.10.20 @ 11:46) >  IMPRESSION: Negative unchanged chest.    < end of copied text >    MEDICATIONS:  MEDICATIONS  (STANDING):  influenza   Vaccine 0.5 milliLiter(s) IntraMuscular once  lamoTRIgine 150 milliGRAM(s) Oral daily    TELEMETRY REVIEW:  Sinus rhythm 50-70s CHIEF COMPLAINT/DIAGNOSIS: chest pain 	    HPI: 27 y/o male w/ pmhx of MI at 20 due to methamphetamine use and borderline personality disorder ,presenting for eval of chest pain, palpitations and cough last night while trying to sleep.      9/11 - no complaints. denies cp, palp, sob.  REVIEW OF SYSTEMS:  All other review of systems is negative unless indicated above    PHYSICAL EXAM:  Constitutional: NAD, awake and alert, well-developed  HEENT: PERR, EOMI, Normal Hearing, MMM  Neck: Soft and supple, No LAD, No JVD  Respiratory: Breath sounds are clear bilaterally, No wheezing, rales or rhonchi  Cardiovascular: S1 and S2, regular rate and rhythm, no Murmurs, gallops or rubs  Gastrointestinal: Bowel Sounds present, soft, nontender, nondistended, no guarding, no rebound  Extremities: No peripheral edema  Vascular: 2+ peripheral pulses  Neurological: A/O x 3, no focal deficits  Musculoskeletal: 5/5 strength b/l upper and lower extremities  Skin: No rashes    Vital Signs Last 24 Hrs  T(C): 36.8 (11 Sep 2020 07:18), Max: 36.9 (10 Sep 2020 10:49)  T(F): 98.2 (11 Sep 2020 07:18), Max: 98.5 (10 Sep 2020 10:49)  HR: 73 (11 Sep 2020 07:18) (73 - 108)  BP: 103/51 (11 Sep 2020 07:18) (103/51 - 131/74)  BP(mean): 92 (10 Sep 2020 15:27) (89 - 92)  RR: 18 (11 Sep 2020 07:18) (14 - 19)  SpO2: 97% (11 Sep 2020 07:18) (97% - 100%) -- room air    LABS: All Labs Reviewed:                        14.6   8.16  )-----------( 253      ( 10 Sep 2020 13:17 )             43.6     09-10    138  |  106  |  14  ----------------------------<  86  4.3   |  30  |  1.00    Ca    8.9      10 Sep 2020 13:17    TPro  7.6  /  Alb  3.9  /  TBili  0.5  /  DBili  x   /  AST  28  /  ALT  91<H>  /  AlkPhos  109  09-10    PT/INR - ( 10 Sep 2020 13:17 )   PT: 13.2 sec;   INR: 1.14 ratio     PTT - ( 10 Sep 2020 13:17 )  PTT:35.7 sec  CARDIAC MARKERS ( 10 Sep 2020 17:18 )  <0.015 ng/mL / x     / x     / x     / x      CARDIAC MARKERS ( 10 Sep 2020 15:24 )  <0.015 ng/mL / x     / x     / x     / x      CARDIAC MARKERS ( 10 Sep 2020 13:17 )  <0.015 ng/mL / x     / x     / x     / x          RADIOLOGY:  < from: Xray Chest 1 View AP/PA. (09.10.20 @ 11:46) >  IMPRESSION: Negative unchanged chest.    < end of copied text >    MEDICATIONS  (STANDING):  lamoTRIgine 150 milliGRAM(s) Oral daily    MEDICATIONS  (PRN):      TELEMETRY REVIEW:  Sinus rhythm 50-70s, occasionally up to 110-120s     ASSESSMENT AND PLAN:    21 y/o male w/ pmhx of MI due to methamphetamine abuse, and borderline personality presenting to ED for eval of intermittent chest pain, palpitations, diaphoresis and nausea while trying to sleep.     1) chest pain r/o ACS   - troponin x3 negative, ecg reviewed. cleared by cardio for d/c. nonspecific t wave changes but no significant. sinus tach noted.  - urine drug screen positive for methamphetamine. advised to quit.  - cxr negative for acute findings   - cardio recc - outpatient cardiac CT and follow up.     2) borderline personality disorder   - cont with Lamictal     3) Hx MI  - due to follow upw tih Dr. amanda but has been postponing bec/ of work    patient to follow up with his own cardio - dr. amanda for cardiac CT CHIEF COMPLAINT/DIAGNOSIS: chest pain 	    HPI: 25 y/o male w/ pmhx of MI at 20 due to methamphetamine use and borderline personality disorder ,presenting for eval of chest pain, palpitations and cough last night while trying to sleep.      9/11 - no complaints. denies cp, palp, sob.  REVIEW OF SYSTEMS:  All other review of systems is negative unless indicated above    PHYSICAL EXAM:  Constitutional: NAD, awake and alert, well-developed  HEENT: PERR, EOMI, Normal Hearing, MMM  Neck: Soft and supple, No LAD, No JVD  Respiratory: Breath sounds are clear bilaterally, No wheezing, rales or rhonchi  Cardiovascular: S1 and S2, regular rate and rhythm, no Murmurs, gallops or rubs  Gastrointestinal: Bowel Sounds present, soft, nontender, nondistended, no guarding, no rebound  Extremities: No peripheral edema  Vascular: 2+ peripheral pulses  Neurological: A/O x 3, no focal deficits  Musculoskeletal: 5/5 strength b/l upper and lower extremities  Skin: No rashes    Vital Signs Last 24 Hrs  T(C): 36.8 (11 Sep 2020 07:18), Max: 36.9 (10 Sep 2020 10:49)  T(F): 98.2 (11 Sep 2020 07:18), Max: 98.5 (10 Sep 2020 10:49)  HR: 73 (11 Sep 2020 07:18) (73 - 108)  BP: 103/51 (11 Sep 2020 07:18) (103/51 - 131/74)  BP(mean): 92 (10 Sep 2020 15:27) (89 - 92)  RR: 18 (11 Sep 2020 07:18) (14 - 19)  SpO2: 97% (11 Sep 2020 07:18) (97% - 100%) -- room air    LABS: All Labs Reviewed:                        14.6   8.16  )-----------( 253      ( 10 Sep 2020 13:17 )             43.6     09-10    138  |  106  |  14  ----------------------------<  86  4.3   |  30  |  1.00    Ca    8.9      10 Sep 2020 13:17    TPro  7.6  /  Alb  3.9  /  TBili  0.5  /  DBili  x   /  AST  28  /  ALT  91<H>  /  AlkPhos  109  09-10    PT/INR - ( 10 Sep 2020 13:17 )   PT: 13.2 sec;   INR: 1.14 ratio     PTT - ( 10 Sep 2020 13:17 )  PTT:35.7 sec  CARDIAC MARKERS ( 10 Sep 2020 17:18 )  <0.015 ng/mL / x     / x     / x     / x      CARDIAC MARKERS ( 10 Sep 2020 15:24 )  <0.015 ng/mL / x     / x     / x     / x      CARDIAC MARKERS ( 10 Sep 2020 13:17 )  <0.015 ng/mL / x     / x     / x     / x          RADIOLOGY:  < from: Xray Chest 1 View AP/PA. (09.10.20 @ 11:46) >  IMPRESSION: Negative unchanged chest.    < end of copied text >    MEDICATIONS  (STANDING):  lamoTRIgine 150 milliGRAM(s) Oral daily    MEDICATIONS  (PRN):      TELEMETRY REVIEW:  Sinus rhythm 50-70s, occasionally up to 110-120s     ASSESSMENT AND PLAN:    19 y/o male w/ pmhx of MI due to methamphetamine abuse, and borderline personality presenting to ED for eval of intermittent chest pain, palpitations, diaphoresis and nausea while trying to sleep.     1) chest pain r/o ACS   - troponin x3 negative, ecg reviewed. cleared by cardio for d/c. nonspecific t wave changes but no significant. sinus tach noted.  - urine drug screen positive for methamphetamine. advised to quit.  - cxr negative for acute findings   - cardio recc - outpatient cardiac CT and follow up.     2) borderline personality disorder   - cont with Lamictal     3) Hx MI  - reports EF fuction reduced at 35% not on meds??  - due to follow up wtih Dr. amanda but has been postponing bec/ of work    patient to follow up with his own cardio - dr. amanda for cardiac CT CHIEF COMPLAINT/DIAGNOSIS: chest pain 	    HPI: 27 y/o male w/ pmhx of MI at 20 due to methamphetamine use and borderline personality disorder ,presenting for eval of chest pain, palpitations and cough last night while trying to sleep.      9/11 - no complaints. denies cp, palp, sob.  Wants to leave.  Has appt for cardiac CT today.     REVIEW OF SYSTEMS:  All other review of systems is negative unless indicated above    PHYSICAL EXAM:  Constitutional: NAD, awake and alert, well-developed  HEENT: PERR, EOMI, Normal Hearing, MMM  Neck: Soft and supple, No LAD, No JVD  Respiratory: Breath sounds are clear bilaterally, No wheezing, rales or rhonchi  Cardiovascular: S1 and S2, regular rate and rhythm, no Murmurs, gallops or rubs  Gastrointestinal: Bowel Sounds present, soft, nontender, nondistended, no guarding, no rebound  Extremities: No peripheral edema  Vascular: 2+ peripheral pulses  Neurological: A/O x 3, no focal deficits  Musculoskeletal: 5/5 strength b/l upper and lower extremities  Skin: No rashes    Vital Signs Last 24 Hrs  T(C): 36.8 (11 Sep 2020 07:18), Max: 36.9 (10 Sep 2020 10:49)  T(F): 98.2 (11 Sep 2020 07:18), Max: 98.5 (10 Sep 2020 10:49)  HR: 73 (11 Sep 2020 07:18) (73 - 108)  BP: 103/51 (11 Sep 2020 07:18) (103/51 - 131/74)  BP(mean): 92 (10 Sep 2020 15:27) (89 - 92)  RR: 18 (11 Sep 2020 07:18) (14 - 19)  SpO2: 97% (11 Sep 2020 07:18) (97% - 100%) -- room air    LABS: All Labs Reviewed:                        14.6   8.16  )-----------( 253      ( 10 Sep 2020 13:17 )             43.6     09-10    138  |  106  |  14  ----------------------------<  86  4.3   |  30  |  1.00    Ca    8.9      10 Sep 2020 13:17    TPro  7.6  /  Alb  3.9  /  TBili  0.5  /  DBili  x   /  AST  28  /  ALT  91<H>  /  AlkPhos  109  09-10    PT/INR - ( 10 Sep 2020 13:17 )   PT: 13.2 sec;   INR: 1.14 ratio     PTT - ( 10 Sep 2020 13:17 )  PTT:35.7 sec  CARDIAC MARKERS ( 10 Sep 2020 17:18 )  <0.015 ng/mL / x     / x     / x     / x      CARDIAC MARKERS ( 10 Sep 2020 15:24 )  <0.015 ng/mL / x     / x     / x     / x      CARDIAC MARKERS ( 10 Sep 2020 13:17 )  <0.015 ng/mL / x     / x     / x     / x          RADIOLOGY:  < from: Xray Chest 1 View AP/PA. (09.10.20 @ 11:46) >  IMPRESSION: Negative unchanged chest.    < end of copied text >    MEDICATIONS  (STANDING):  lamoTRIgine 150 milliGRAM(s) Oral daily    MEDICATIONS  (PRN):      TELEMETRY REVIEW:  Sinus rhythm 50-70s, occasionally up to 110-120s     ASSESSMENT AND PLAN:    19 y/o male w/ pmhx of MI due to methamphetamine abuse, and borderline personality presenting to ED for eval of intermittent chest pain, palpitations, diaphoresis and nausea while trying to sleep.     1) chest pain r/o ACS   - troponin x3 negative, ecg reviewed. cleared by cardio for d/c. nonspecific t wave changes but no significant. sinus tach noted.  - urine drug screen positive for methamphetamine. advised to quit.  - cxr negative for acute findings   - cardio recc - outpatient cardiac CT and follow up.     2) borderline personality disorder   - cont with Lamictal     3) Hx MI  - reports EF fuction reduced at 35% not on meds??    - due to follow up wtih Dr. amanda but has been postponing bec/ of work    patient to follow up with his own cardio - dr. amanda for cardiac CT

## 2020-09-11 NOTE — DISCHARGE NOTE PROVIDER - NSDCMRMEDTOKEN_GEN_ALL_CORE_FT
Abilify Maintena Prefilled Syringe 400 mg intramuscular injection, extended release: 1 dose(s) intramuscular once a month  guanFACINE 2 mg oral tablet, extended release: 1 tab(s) orally once a day (in the morning)  LaMICtal 150 mg oral tablet: 1 tab(s) orally once a day

## 2020-09-11 NOTE — DISCHARGE NOTE PROVIDER - NSDCCAREPROVSEEN_GEN_ALL_CORE_FT
[employer] (Nurse Practitioner)   Lizzeth Conte (Hospitalist) [employer] (Nurse Practitioner)   Jose F Kumar Jeanette

## 2020-09-15 DIAGNOSIS — F60.3 BORDERLINE PERSONALITY DISORDER: ICD-10-CM

## 2020-09-15 DIAGNOSIS — R07.9 CHEST PAIN, UNSPECIFIED: ICD-10-CM

## 2020-09-15 DIAGNOSIS — Z71.51 DRUG ABUSE COUNSELING AND SURVEILLANCE OF DRUG ABUSER: ICD-10-CM

## 2020-09-15 DIAGNOSIS — F15.188: ICD-10-CM

## 2020-09-15 DIAGNOSIS — I25.2 OLD MYOCARDIAL INFARCTION: ICD-10-CM

## 2020-09-15 DIAGNOSIS — F31.9 BIPOLAR DISORDER, UNSPECIFIED: ICD-10-CM

## 2020-09-15 DIAGNOSIS — Z23 ENCOUNTER FOR IMMUNIZATION: ICD-10-CM

## 2020-10-15 ENCOUNTER — EMERGENCY (EMERGENCY)
Facility: HOSPITAL | Age: 25
LOS: 0 days | Discharge: HOME | End: 2020-10-16
Attending: EMERGENCY MEDICINE
Payer: COMMERCIAL

## 2020-10-15 VITALS
DIASTOLIC BLOOD PRESSURE: 84 MMHG | WEIGHT: 199.96 LBS | RESPIRATION RATE: 16 BRPM | TEMPERATURE: 99 F | HEIGHT: 72 IN | OXYGEN SATURATION: 85 % | HEART RATE: 118 BPM | SYSTOLIC BLOOD PRESSURE: 124 MMHG
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
## 2020-10-15 DIAGNOSIS — T40.1X1A POISONING BY HEROIN, ACCIDENTAL (UNINTENTIONAL), INITIAL ENCOUNTER: ICD-10-CM
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
## 2020-10-15 PROCEDURE — 93005 ELECTROCARDIOGRAM TRACING: CPT

## 2020-10-15 PROCEDURE — 99283 EMERGENCY DEPT VISIT LOW MDM: CPT

## 2020-10-15 PROCEDURE — 71045 X-RAY EXAM CHEST 1 VIEW: CPT

## 2020-10-15 PROCEDURE — 71045 X-RAY EXAM CHEST 1 VIEW: CPT | Mod: 26

## 2020-10-15 PROCEDURE — 99285 EMERGENCY DEPT VISIT HI MDM: CPT | Mod: 25

## 2020-10-15 PROCEDURE — 93010 ELECTROCARDIOGRAM REPORT: CPT

## 2020-10-15 NOTE — ED PROVIDER NOTE - OBJECTIVE STATEMENT
25 y/o M with PMHx of borderline personality disorder presents to the ED BIBEMS s/p heroin OD. Was given 8 of Narcan IN prior to EMS arrival. EMS reports pt's O2 sat on RA was 74%, placed on 6L and brought up to 93%. No fever. NKDA.

## 2020-10-15 NOTE — ED PROVIDER NOTE - NSFOLLOWUPINSTRUCTIONS_ED_ALL_ED_FT
What are opioids?  Opioids, sometimes called narcotics, are a type of drug. They include strong prescription pain relievers, such as oxycodone, hydrocodone, fentanyl, and tramadol. The illegal drug heroin is also an opioid.    A health care provider may give you a prescription opioid to reduce pain after you have had a major injury or surgery. You may get them if you have severe pain from health conditions like cancer. Some health care providers prescribe them for chronic pain.    Prescription opioids used for pain relief are generally safe when taken for a short time and as prescribed by your health care provider. However, people who take opioids are at risk for opioid dependence and addiction, as well as an overdose. These risks increase when opioids are misused. Misuse means you are not taking the medicines according to your provider's instructions, you are using them to get high, or you are taking someone else's opioids.    What is an opioid overdose?  Opioids affect the part of the brain that regulates breathing. When people take high doses of opioids, it can lead to an overdose, with the slowing or stopping of breathing and sometimes death.    What causes an opioid overdose?  An opioid overdose can happen for a variety of reasons, including if you    Take an opioid to get high  Take an extra dose of a prescription opioid or take it too often (either accidentally or on purpose)  Mix an opioid with other medicines, illegal drugs, or alcohol. An overdose can be fatal when mixing an opioid and certain anxiety treatment medicines, such as Xanax or Valium.  Take an opioid medicine that was prescribed for someone else. Children are especially at risk of an accidental overdose if they take medicine not intended for them.  There is also a risk of overdose if you are getting medication-assisted treatment (MAT). MAT is a treatment for opioid abuse and addiction. Many of the medicines used for MAT are controlled substances that can be misused.    Who is at risk for an opioid overdose?  Anyone who takes an opioid can be at risk of an overdose, but you are at higher risk if you    Take illegal opioids  Take more opioid medicine than you are prescribed  Combine opioids with other medicines and/or alcohol  Have certain medical conditions, such as sleep apnea, or reduced kidney or liver function  Are over 65 years old  What are the signs of an opioid overdose?  The signs of an opioid overdose include    The person's face is extremely pale and/or feels clammy to the touch  Their body goes limp  Their fingernails or lips have a purple or blue color  They start vomiting or making gurgling noises  They cannot be awakened or are unable to speak  Their breathing or heartbeat slows or stops  What should I do if I think that someone is having an opioid overdose?  If you think someone is having an opioid overdose,    Call 9-1-1 immediately  Administer naloxone, if it is available. Naloxone is a safe medication that can quickly stop an opioid overdose. It can be injected into the muscle or sprayed into the nose to rapidly block the effects of the opioid on the body.  Try to keep the person awake and breathing  Lay the person on their side to prevent choking  Stay with the person until emergency workers arrive  Can an opioid overdose be prevented?  There are steps you can take to help prevent an overdose:    Take your medicine exactly as prescribed by your health care provider. Do not take more medicine at once or take medicine more often than you are supposed to.  Never mix pain medicines with alcohol, sleeping pills, or illegal substances  Store medicine safely where children or pets can't reach it. Consider using a medicine lockbox. Besides keeping children safe, it also prevents someone who lives with you or visits your house from stealing your medicines.  Dispose of unused medicine promptly  If you take an opioid, it is also important to teach your family and friends how to respond to an overdose. If you are at high risk for an overdose, ask your health care provider about whether you need a prescription for naloxone.

## 2020-10-15 NOTE — ED PROVIDER NOTE - NS ED ATTENDING STATEMENT MOD
This patient was not seen and evaluated by the attending physician. CHIEF COMPLAINT  Hand Pain (L 2nd finger pain, swelling, and redness.  )      HISTORY OF PRESENT ILLNESS  Tao Pitts is a 20 y.o. male who presents to the ED for evaluation of pain to his left index finger. Patient states that he has swelling and redness, states that he was placed on Bactrim and has been on Bactrim for the last several days and feels like the area is getting worse. He has swelling and redness noted along the dorsum of the left second digit. Patient has no fevers. He is here for further relation. LOCATION:left 2nd finger  QUALITY:ache  SEVERITY:5  DURATION:several days  MODIFYING FACTORS:worse with palpation, movement. No other complaints, modifying factors or associated symptoms. Nursing notes reviewed. No past medical history on file. Past Surgical History:   Procedure Laterality Date    FINGER SURGERY Left     I&D    UPPER GASTROINTESTINAL ENDOSCOPY       No family history on file. Social History     Socioeconomic History    Marital status:      Spouse name: Not on file    Number of children: Not on file    Years of education: Not on file    Highest education level: Not on file   Occupational History    Not on file   Social Needs    Financial resource strain: Not on file    Food insecurity     Worry: Not on file     Inability: Not on file    Transportation needs     Medical: Not on file     Non-medical: Not on file   Tobacco Use    Smoking status: Current Every Day Smoker     Packs/day: 0.50    Smokeless tobacco: Never Used   Substance and Sexual Activity    Alcohol use:  Yes    Drug use: Never    Sexual activity: Not on file   Lifestyle    Physical activity     Days per week: Not on file     Minutes per session: Not on file    Stress: Not on file   Relationships    Social connections     Talks on phone: Not on file     Gets together: Not on file     Attends [employer] %. DISPOSITION  Patient was discharged to home in good condition.        Jeannette Robles, RAVI - CNP  07/10/20 0168 Attending Only

## 2020-10-15 NOTE — ED ADULT NURSE NOTE - NSIMPLEMENTINTERV_GEN_ALL_ED
Implemented All Fall with Harm Risk Interventions:  [place] to call system. Call bell, personal items and telephone within reach. Instruct patient to call for assistance. Room bathroom lighting operational. Non-slip footwear when patient is off stretcher. Physically safe environment: no spills, clutter or unnecessary equipment. Stretcher in lowest position, wheels locked, appropriate side rails in place. Provide visual cue, wrist band, yellow gown, etc. Monitor gait and stability. Monitor for mental status changes and reorient to person, place, and time. Review medications for side effects contributing to fall risk. Reinforce activity limits and safety measures with patient and family. Provide visual clues: red socks.

## 2020-10-15 NOTE — ED PROVIDER NOTE - CLINICAL SUMMARY MEDICAL DECISION MAKING FREE TEXT BOX
CXR, EKG, close monitoring, offered rehab options and SBIRT consultation but declined. Will reassess.

## 2020-10-15 NOTE — ED ADULT NURSE NOTE - OBJECTIVE STATEMENT
pt presents to ED after heroin overdose. states he was using with a friend who called EMS. 8mg intranasal narcan given prior to arrival. pt O2 74% on RA upon EMS arrival to scene. in ED pt 93% on 6L NC. pt placed on non-rebreather mask with O2 sat 100%. in ED pt is A&O x3. denies SOB, CP.

## 2020-10-15 NOTE — ED ADULT NURSE NOTE - CHIEF COMPLAINT QUOTE
pt BIBA s/p heroin OD, given total 8 of narcan intranasally prior to EMS arrival. EMS reports pt's O2 sat on room air was 74%, placed on 6L and brought up to 93%. pt's O2 sat upon arrival 85% on room air, 90% on 6L. pt brought into room and placed on NRB. MD Bennett at bedside

## 2020-10-15 NOTE — ED PROVIDER NOTE - PROGRESS NOTE DETAILS
pt awake alert, tolerating PO.  Pox >95% on room air. no complaints. does not want SBIRT consult.  Narcan kit given for home

## 2020-10-15 NOTE — ED PROVIDER NOTE - PATIENT PORTAL LINK FT
You can access the FollowMyHealth Patient Portal offered by [facility] by registering at the following website: [email or web address]. By joining [facility]’s FollowMyHealth portal, you will also be able to view your health information using other applications (apps) compatible with our system.

## 2020-10-15 NOTE — ED ADULT NURSE REASSESSMENT NOTE - NS ED NURSE REASSESS COMMENT FT1
pt awake, alert, oriented and drank water. VSS. O2 % on 2L NC. safety maintained, call bell within reach.

## 2020-10-16 VITALS
SYSTOLIC BLOOD PRESSURE: 106 MMHG | RESPIRATION RATE: 14 BRPM | OXYGEN SATURATION: 94 % | HEART RATE: 75 BPM | DIASTOLIC BLOOD PRESSURE: 67 MMHG

## 2020-10-16 NOTE — ED ADULT NURSE REASSESSMENT NOTE - NS ED NURSE REASSESS COMMENT FT1
pt offered SBIRT/ detox services. declines staying in ED for SBIRT services. pt is awake, A&O x3. ambulated to bathroom with steady gait. pt offered SBIRT/ detox services. declines staying in ED for SBIRT services. pt provided narcan kit and educated on how to use narcan kit.

## 2021-08-02 NOTE — PATIENT PROFILE ADULT - NSTOBACCOWITHDRW_GEN_A_CORE_SD
Get your labs checked in [address]. Get fasting lab work done (no food or drink besides water after midnight and medicines as prescribed) before the next follow up please. no withdrawal symptoms

## 2021-10-28 NOTE — ED PROVIDER NOTE - GASTROINTESTINAL NEGATIVE STATEMENT, MLM
Pt finished 48 hour chemo treatment. Nothing new about his treatment. Pt running fever was 104. Pt took 2 tylenol at 1600. No complaints other than fever. Pt is vaccinated. Colon cancer.    no abdominal pain, no bloating, no constipation, no diarrhea, no nausea and no vomiting.

## 2022-04-22 NOTE — ED ADULT NURSE NOTE - NS ED NURSE RECORD ANOTHER VITAL SIGN
Yes pt dx left pta on 4/14/22 presents for increased pain; o2 sat 100 on room air, afebrile pt dx left pta on 4/14/22 presents for increased pain and difficulty swallowing; o2 sat 100 on room air, afebrile pt dx left pta on 4/14/22 presents for increased pain and difficulty swallowing/clearing secretions; o2 sat 100 on room air, afebrile

## 2022-11-21 NOTE — ED PROVIDER NOTE - CROS ED ROS STATEMENT
Lab Results   Component Value Date    5COL Yellow 03/19/2020    5UAPP Clear 03/19/2020    5UGLU Negative 03/19/2020    5UBILI Negative 03/19/2020    5UKET Negative 03/19/2020    5USPG 1.015 03/19/2020    5URBC Trace 03/19/2020    5UPH 6.0 03/19/2020    5UPROT Negative 03/19/2020    5UURP 0.2 03/19/2020    POCTUNITR Negative 03/19/2020    5UWBC Negative 03/19/2020       
independent
all other ROS negative except as per HPI

## 2022-12-05 NOTE — ED BEHAVIORAL HEALTH ASSESSMENT NOTE - NS ED BHA MED ROS NEUROLOGICAL
Pharmacy calling in on diazepam. Directions state one every 8 hours with no quantity of days but on last prescriptions states for at least 30 days. They are calling to ask if that needs to be stated on the prescription. Please give pharmacy a call.   Unable to assess

## 2023-03-28 NOTE — ED ADULT TRIAGE NOTE - ACCOMPANIED BY
Police Carac Counseling:  I discussed with the patient the risks of Carac including but not limited to erythema, scaling, itching, weeping, crusting, and pain.

## 2023-05-01 NOTE — ED BEHAVIORAL HEALTH ASSESSMENT NOTE - ACCESS TO FIREARM
Remain in [address] as previously instructed  May remove for hygiene  May take Ibuprofen or Motrin as needed  Rest, ice, and elevate as needed  Work on wrist ROM exercises as discussed  Follow up in 3 weeks
No
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
## 2024-06-25 NOTE — ED PROVIDER NOTE - NS ED MD EM SELECTION
Problem: Potential for Falls  Goal: Patient will remain free of falls  Description: INTERVENTIONS:  - Educate patient/family on patient safety including physical limitations  - Instruct patient to call for assistance with activity   - Keep Call bell within reach  - Keep care items and personal belongings within reach  - Initiate and maintain comfort rounds    Outcome: Progressing      [id number] Comprehensive
